# Patient Record
Sex: MALE | NOT HISPANIC OR LATINO | URBAN - METROPOLITAN AREA
[De-identification: names, ages, dates, MRNs, and addresses within clinical notes are randomized per-mention and may not be internally consistent; named-entity substitution may affect disease eponyms.]

---

## 2017-06-27 ENCOUNTER — CHARTING TRANS (OUTPATIENT)
Dept: URGENT CARE | Age: 82
End: 2017-06-27

## 2017-06-27 ASSESSMENT — PAIN SCALES - GENERAL: PAINLEVEL_OUTOF10: 0

## 2017-06-29 ENCOUNTER — IMAGING SERVICES (OUTPATIENT)
Dept: OTHER | Age: 82
End: 2017-06-29

## 2017-06-29 ENCOUNTER — CHARTING TRANS (OUTPATIENT)
Dept: URGENT CARE | Age: 82
End: 2017-06-29

## 2017-06-29 ASSESSMENT — PAIN SCALES - GENERAL: PAINLEVEL_OUTOF10: 8

## 2018-03-01 ENCOUNTER — CHARTING TRANS (OUTPATIENT)
Dept: OTHER | Age: 83
End: 2018-03-01

## 2018-03-06 ENCOUNTER — CHARTING TRANS (OUTPATIENT)
Dept: OTHER | Age: 83
End: 2018-03-06

## 2018-03-11 ENCOUNTER — CHARTING TRANS (OUTPATIENT)
Dept: OTHER | Age: 83
End: 2018-03-11

## 2018-11-28 VITALS
RESPIRATION RATE: 18 BRPM | OXYGEN SATURATION: 100 % | SYSTOLIC BLOOD PRESSURE: 138 MMHG | HEART RATE: 56 BPM | TEMPERATURE: 97.4 F | DIASTOLIC BLOOD PRESSURE: 74 MMHG

## 2018-11-28 VITALS
DIASTOLIC BLOOD PRESSURE: 60 MMHG | TEMPERATURE: 98 F | HEART RATE: 66 BPM | RESPIRATION RATE: 16 BRPM | OXYGEN SATURATION: 99 % | SYSTOLIC BLOOD PRESSURE: 120 MMHG

## 2019-03-06 VITALS
HEART RATE: 66 BPM | SYSTOLIC BLOOD PRESSURE: 138 MMHG | DIASTOLIC BLOOD PRESSURE: 74 MMHG | OXYGEN SATURATION: 100 % | RESPIRATION RATE: 24 BRPM | TEMPERATURE: 98.6 F

## 2019-03-06 VITALS
RESPIRATION RATE: 18 BRPM | TEMPERATURE: 98 F | HEART RATE: 60 BPM | SYSTOLIC BLOOD PRESSURE: 124 MMHG | DIASTOLIC BLOOD PRESSURE: 58 MMHG | OXYGEN SATURATION: 99 %

## 2019-03-06 VITALS
TEMPERATURE: 97.6 F | DIASTOLIC BLOOD PRESSURE: 64 MMHG | HEART RATE: 55 BPM | OXYGEN SATURATION: 98 % | RESPIRATION RATE: 22 BRPM | SYSTOLIC BLOOD PRESSURE: 118 MMHG

## 2019-09-06 ENCOUNTER — WALK IN (OUTPATIENT)
Dept: URGENT CARE | Age: 84
End: 2019-09-06

## 2019-09-06 DIAGNOSIS — J32.9 SINUSITIS, UNSPECIFIED CHRONICITY, UNSPECIFIED LOCATION: Primary | ICD-10-CM

## 2019-09-06 PROCEDURE — 99214 OFFICE O/P EST MOD 30 MIN: CPT | Performed by: FAMILY MEDICINE

## 2019-09-06 RX ORDER — LATANOPROST 50 UG/ML
1 SOLUTION/ DROPS OPHTHALMIC 2 TIMES DAILY
COMMUNITY

## 2019-09-06 RX ORDER — DEXTROMETHORPHAN HYDROBROMIDE AND PROMETHAZINE HYDROCHLORIDE 15; 6.25 MG/5ML; MG/5ML
5 SYRUP ORAL 4 TIMES DAILY PRN
Qty: 240 ML | Refills: 0 | Status: SHIPPED | OUTPATIENT
Start: 2019-09-06 | End: 2019-09-16

## 2019-09-06 RX ORDER — LOSARTAN POTASSIUM 50 MG/1
1 TABLET ORAL DAILY
COMMUNITY

## 2019-09-06 RX ORDER — AMOXICILLIN 875 MG/1
875 TABLET, COATED ORAL 2 TIMES DAILY
Qty: 20 TABLET | Refills: 0 | Status: SHIPPED | OUTPATIENT
Start: 2019-09-06 | End: 2022-10-28 | Stop reason: ALTCHOICE

## 2019-09-06 SDOH — HEALTH STABILITY: MENTAL HEALTH: HOW OFTEN DO YOU HAVE A DRINK CONTAINING ALCOHOL?: NEVER

## 2019-09-06 ASSESSMENT — PAIN SCALES - GENERAL: PAINLEVEL: 0

## 2021-01-14 ENCOUNTER — WALK IN (OUTPATIENT)
Dept: URGENT CARE | Age: 86
End: 2021-01-14

## 2021-01-14 VITALS
HEART RATE: 64 BPM | OXYGEN SATURATION: 100 % | DIASTOLIC BLOOD PRESSURE: 72 MMHG | TEMPERATURE: 98 F | SYSTOLIC BLOOD PRESSURE: 124 MMHG | RESPIRATION RATE: 18 BRPM

## 2021-01-14 DIAGNOSIS — R68.2 DRY MOUTH: Primary | ICD-10-CM

## 2021-01-14 PROCEDURE — 99213 OFFICE O/P EST LOW 20 MIN: CPT | Performed by: EMERGENCY MEDICINE

## 2021-02-16 ENCOUNTER — HOSPITAL ENCOUNTER (EMERGENCY)
Facility: HOSPITAL | Age: 86
Discharge: HOME | End: 2021-02-16
Attending: EMERGENCY MEDICINE
Payer: MEDICARE

## 2021-02-16 ENCOUNTER — APPOINTMENT (OUTPATIENT)
Dept: GENERAL RADIOLOGY | Facility: HOSPITAL | Age: 86
End: 2021-02-16
Attending: EMERGENCY MEDICINE
Payer: MEDICARE

## 2021-02-16 VITALS
HEART RATE: 64 BPM | DIASTOLIC BLOOD PRESSURE: 74 MMHG | RESPIRATION RATE: 14 BRPM | TEMPERATURE: 98 F | OXYGEN SATURATION: 99 % | SYSTOLIC BLOOD PRESSURE: 184 MMHG

## 2021-02-16 DIAGNOSIS — M54.9 MODERATE BACK PAIN: Primary | ICD-10-CM

## 2021-02-16 PROCEDURE — 72110 X-RAY EXAM L-2 SPINE 4/>VWS: CPT | Performed by: EMERGENCY MEDICINE

## 2021-02-16 PROCEDURE — 99283 EMERGENCY DEPT VISIT LOW MDM: CPT | Performed by: EMERGENCY MEDICINE

## 2021-02-16 RX ORDER — HYDROCODONE BITARTRATE AND ACETAMINOPHEN 5; 325 MG/1; MG/1
1-2 TABLET ORAL EVERY 6 HOURS PRN
Qty: 12 TABLET | Refills: 0 | Status: SHIPPED | OUTPATIENT
Start: 2021-02-16 | End: 2021-02-23

## 2021-02-16 NOTE — ED PROVIDER NOTES
Patient Seen in: [facility] Emergency Department      History   Patient presents with:  Back Pain    Stated Complaint: Right flank/back pain x few months     HPI/Subjective:   HPI    This is 12-year-old man, history of hypertension prostate CA, here rate and regular rhythm. No Edema  Pulmonary:  Pulmonary effort is normal.  Normal breath sounds. No wheezing, rhonchi or rales. Abdominal: Soft nontender nondistended, normal bowel sounds, no guarding no rebound tenderness  Back: No midline tenderness. advised follow-up with PMD for further evaluation PT referral.  Return precautions discussed patient agrees with plan.                          Disposition and Plan     Clinical Impression:  Moderate back pain  (primary encounter diagnosis)    Disposition:

## 2021-02-16 NOTE — ED INITIAL ASSESSMENT (HPI)
To ED for chronic back in the left lower back x7 moths. No injuries. No pain at rest. Slight pain with bending and lifting. No numbness or tingling. No trauma. Ambulates without difficulty. \"I do 80 push up's in the morning and at night.  It hurts when I d

## 2021-05-25 VITALS
HEART RATE: 56 BPM | OXYGEN SATURATION: 97 % | DIASTOLIC BLOOD PRESSURE: 68 MMHG | SYSTOLIC BLOOD PRESSURE: 138 MMHG | RESPIRATION RATE: 16 BRPM | TEMPERATURE: 96.8 F

## 2021-07-16 ENCOUNTER — HOSPITAL ENCOUNTER (EMERGENCY)
Facility: HOSPITAL | Age: 86
Discharge: HOME | End: 2021-07-16
Attending: EMERGENCY MEDICINE
Payer: MEDICARE

## 2021-07-16 ENCOUNTER — APPOINTMENT (OUTPATIENT)
Dept: CT IMAGING | Facility: HOSPITAL | Age: 86
End: 2021-07-16
Attending: EMERGENCY MEDICINE
Payer: MEDICARE

## 2021-07-16 VITALS
HEIGHT: 66 IN | OXYGEN SATURATION: 98 % | TEMPERATURE: 99 F | WEIGHT: 150 LBS | BODY MASS INDEX: 24.11 KG/M2 | RESPIRATION RATE: 20 BRPM | DIASTOLIC BLOOD PRESSURE: 64 MMHG | HEART RATE: 51 BPM | SYSTOLIC BLOOD PRESSURE: 163 MMHG

## 2021-07-16 DIAGNOSIS — R31.29 HEMATURIA, MICROSCOPIC: ICD-10-CM

## 2021-07-16 DIAGNOSIS — M54.9 BACK PAIN WITHOUT RADIATION: Primary | ICD-10-CM

## 2021-07-16 LAB
ALBUMIN SERPL-MCNC: 3.9 G/DL (ref 3.4–5)
ALBUMIN/GLOB SERPL: 1.2 {RATIO} (ref 1–2)
ALP LIVER SERPL-CCNC: 73 U/L
ALT SERPL-CCNC: 38 U/L
ANION GAP SERPL CALC-SCNC: 3 MMOL/L (ref 0–18)
AST SERPL-CCNC: 27 U/L (ref 15–37)
BASOPHILS # BLD AUTO: 0.03 X10(3) UL (ref 0–0.2)
BASOPHILS NFR BLD AUTO: 0.5 %
BILIRUB SERPL-MCNC: 0.3 MG/DL (ref 0.1–2)
BILIRUB UR QL STRIP.AUTO: NEGATIVE
BUN BLD-MCNC: 23 MG/DL (ref 7–18)
BUN/CREAT SERPL: 20.4 (ref 10–20)
CALCIUM BLD-MCNC: 9.3 MG/DL (ref 8.5–10.1)
CHLORIDE SERPL-SCNC: 112 MMOL/L (ref 98–112)
CLARITY UR REFRACT.AUTO: CLEAR
CO2 SERPL-SCNC: 29 MMOL/L (ref 21–32)
COLOR UR AUTO: COLORLESS
CREAT BLD-MCNC: 1.13 MG/DL
DEPRECATED RDW RBC AUTO: 39.8 FL (ref 35.1–46.3)
EOSINOPHIL # BLD AUTO: 0.14 X10(3) UL (ref 0–0.7)
EOSINOPHIL NFR BLD AUTO: 2.4 %
ERYTHROCYTE [DISTWIDTH] IN BLOOD BY AUTOMATED COUNT: 15.1 % (ref 11–15)
GLOBULIN PLAS-MCNC: 3.3 G/DL (ref 2.8–4.4)
GLUCOSE BLD-MCNC: 97 MG/DL (ref 70–99)
GLUCOSE UR STRIP.AUTO-MCNC: NEGATIVE MG/DL
HCT VFR BLD AUTO: 35 %
HGB BLD-MCNC: 10.9 G/DL
IMM GRANULOCYTES # BLD AUTO: 0.01 X10(3) UL (ref 0–1)
IMM GRANULOCYTES NFR BLD: 0.2 %
KETONES UR STRIP.AUTO-MCNC: NEGATIVE MG/DL
LEUKOCYTE ESTERASE UR QL STRIP.AUTO: NEGATIVE
LYMPHOCYTES # BLD AUTO: 1.63 X10(3) UL (ref 1–4)
LYMPHOCYTES NFR BLD AUTO: 28.1 %
M PROTEIN MFR SERPL ELPH: 7.2 G/DL (ref 6.4–8.2)
MCH RBC QN AUTO: 22.9 PG (ref 26–34)
MCHC RBC AUTO-ENTMCNC: 31.1 G/DL (ref 31–37)
MCV RBC AUTO: 73.4 FL
MONOCYTES # BLD AUTO: 0.79 X10(3) UL (ref 0.1–1)
MONOCYTES NFR BLD AUTO: 13.6 %
NEUTROPHILS # BLD AUTO: 3.21 X10 (3) UL (ref 1.5–7.7)
NEUTROPHILS # BLD AUTO: 3.21 X10(3) UL (ref 1.5–7.7)
NEUTROPHILS NFR BLD AUTO: 55.2 %
NITRITE UR QL STRIP.AUTO: NEGATIVE
OSMOLALITY SERPL CALC.SUM OF ELEC: 302 MOSM/KG (ref 275–295)
PH UR STRIP.AUTO: 5 [PH] (ref 5–8)
PLATELET # BLD AUTO: 212 10(3)UL (ref 150–450)
POTASSIUM SERPL-SCNC: 4.4 MMOL/L (ref 3.5–5.1)
PROT UR STRIP.AUTO-MCNC: NEGATIVE MG/DL
RBC # BLD AUTO: 4.77 X10(6)UL
SODIUM SERPL-SCNC: 144 MMOL/L (ref 136–145)
SP GR UR STRIP.AUTO: 1 (ref 1–1.03)
UROBILINOGEN UR STRIP.AUTO-MCNC: <2 MG/DL
WBC # BLD AUTO: 5.8 X10(3) UL (ref 4–11)

## 2021-07-16 PROCEDURE — 85025 COMPLETE CBC W/AUTO DIFF WBC: CPT | Performed by: EMERGENCY MEDICINE

## 2021-07-16 PROCEDURE — 36415 COLL VENOUS BLD VENIPUNCTURE: CPT

## 2021-07-16 PROCEDURE — 99284 EMERGENCY DEPT VISIT MOD MDM: CPT

## 2021-07-16 PROCEDURE — 74176 CT ABD & PELVIS W/O CONTRAST: CPT | Performed by: EMERGENCY MEDICINE

## 2021-07-16 PROCEDURE — 81001 URINALYSIS AUTO W/SCOPE: CPT | Performed by: EMERGENCY MEDICINE

## 2021-07-16 PROCEDURE — 80053 COMPREHEN METABOLIC PANEL: CPT | Performed by: EMERGENCY MEDICINE

## 2021-07-16 RX ORDER — DOCUSATE SODIUM 100 MG/1
100 CAPSULE, LIQUID FILLED ORAL 2 TIMES DAILY
COMMUNITY

## 2021-07-16 RX ORDER — FERROUS SULFATE TAB EC 324 MG (65 MG FE EQUIVALENT) 324 (65 FE) MG
1 TABLET DELAYED RESPONSE ORAL DAILY
COMMUNITY

## 2021-07-16 RX ORDER — OMEPRAZOLE 20 MG/1
20 CAPSULE, DELAYED RELEASE ORAL
Status: ON HOLD | COMMUNITY
End: 2021-08-19

## 2021-07-16 RX ORDER — TRAMADOL HYDROCHLORIDE 50 MG/1
TABLET ORAL EVERY 6 HOURS PRN
Qty: 10 TABLET | Refills: 0 | Status: SHIPPED | OUTPATIENT
Start: 2021-07-16 | End: 2021-07-23

## 2021-07-16 NOTE — ED INITIAL ASSESSMENT (HPI)
Patient reports left lower back pain for the past 5 months. Patient states that it hurts when he is \"stooped over\", states he feels the pain when he is pulling weeds. describes pain as a \"pinching\" sensation.
2 seconds or less

## 2021-08-12 ENCOUNTER — APPOINTMENT (OUTPATIENT)
Dept: GENERAL RADIOLOGY | Facility: HOSPITAL | Age: 86
DRG: 380 | End: 2021-08-12
Attending: EMERGENCY MEDICINE
Payer: OTHER GOVERNMENT

## 2021-08-12 ENCOUNTER — APPOINTMENT (OUTPATIENT)
Dept: CT IMAGING | Facility: HOSPITAL | Age: 86
DRG: 380 | End: 2021-08-12
Attending: EMERGENCY MEDICINE
Payer: OTHER GOVERNMENT

## 2021-08-12 ENCOUNTER — HOSPITAL ENCOUNTER (INPATIENT)
Facility: HOSPITAL | Age: 86
LOS: 7 days | Discharge: HOME HEALTH | DRG: 380 | End: 2021-08-19
Attending: EMERGENCY MEDICINE | Admitting: HOSPITALIST
Payer: OTHER GOVERNMENT

## 2021-08-12 DIAGNOSIS — K31.5 DUODENAL OBSTRUCTION: Primary | ICD-10-CM

## 2021-08-12 LAB
ALBUMIN SERPL-MCNC: 3.9 G/DL (ref 3.4–5)
ALBUMIN/GLOB SERPL: 1 {RATIO} (ref 1–2)
ALP LIVER SERPL-CCNC: 60 U/L
ALT SERPL-CCNC: 36 U/L
ANION GAP SERPL CALC-SCNC: 4 MMOL/L (ref 0–18)
AST SERPL-CCNC: 33 U/L (ref 15–37)
BASOPHILS # BLD AUTO: 0.03 X10(3) UL (ref 0–0.2)
BASOPHILS NFR BLD AUTO: 0.3 %
BILIRUB SERPL-MCNC: 0.5 MG/DL (ref 0.1–2)
BUN BLD-MCNC: 26 MG/DL (ref 7–18)
CALCIUM BLD-MCNC: 10 MG/DL (ref 8.5–10.1)
CHLORIDE SERPL-SCNC: 108 MMOL/L (ref 98–112)
CO2 SERPL-SCNC: 33 MMOL/L (ref 21–32)
CREAT BLD-MCNC: 1.43 MG/DL
EOSINOPHIL # BLD AUTO: 0.01 X10(3) UL (ref 0–0.7)
EOSINOPHIL NFR BLD AUTO: 0.1 %
ERYTHROCYTE [DISTWIDTH] IN BLOOD BY AUTOMATED COUNT: 15 %
GLOBULIN PLAS-MCNC: 4 G/DL (ref 2.8–4.4)
GLUCOSE BLD-MCNC: 141 MG/DL (ref 70–99)
HCT VFR BLD AUTO: 38.5 %
HGB BLD-MCNC: 12.3 G/DL
IMM GRANULOCYTES # BLD AUTO: 0.05 X10(3) UL (ref 0–1)
IMM GRANULOCYTES NFR BLD: 0.4 %
LIPASE SERPL-CCNC: 1506 U/L (ref 73–393)
LYMPHOCYTES # BLD AUTO: 0.78 X10(3) UL (ref 1–4)
LYMPHOCYTES NFR BLD AUTO: 6.7 %
M PROTEIN MFR SERPL ELPH: 7.9 G/DL (ref 6.4–8.2)
MCH RBC QN AUTO: 23.4 PG (ref 26–34)
MCHC RBC AUTO-ENTMCNC: 31.9 G/DL (ref 31–37)
MCV RBC AUTO: 73.3 FL
MONOCYTES # BLD AUTO: 0.66 X10(3) UL (ref 0.1–1)
MONOCYTES NFR BLD AUTO: 5.7 %
NEUTROPHILS # BLD AUTO: 10.07 X10 (3) UL (ref 1.5–7.7)
NEUTROPHILS # BLD AUTO: 10.07 X10(3) UL (ref 1.5–7.7)
NEUTROPHILS NFR BLD AUTO: 86.8 %
OSMOLALITY SERPL CALC.SUM OF ELEC: 307 MOSM/KG (ref 275–295)
PLATELET # BLD AUTO: 208 10(3)UL (ref 150–450)
POTASSIUM SERPL-SCNC: 3.8 MMOL/L (ref 3.5–5.1)
RBC # BLD AUTO: 5.25 X10(6)UL
SARS-COV-2 RNA RESP QL NAA+PROBE: NOT DETECTED
SODIUM SERPL-SCNC: 145 MMOL/L (ref 136–145)
WBC # BLD AUTO: 11.6 X10(3) UL (ref 4–11)

## 2021-08-12 PROCEDURE — 74176 CT ABD & PELVIS W/O CONTRAST: CPT | Performed by: EMERGENCY MEDICINE

## 2021-08-12 PROCEDURE — 71045 X-RAY EXAM CHEST 1 VIEW: CPT | Performed by: EMERGENCY MEDICINE

## 2021-08-12 PROCEDURE — 99222 1ST HOSP IP/OBS MODERATE 55: CPT | Performed by: HOSPITALIST

## 2021-08-12 RX ORDER — ONDANSETRON 2 MG/ML
4 INJECTION INTRAMUSCULAR; INTRAVENOUS EVERY 6 HOURS PRN
Status: DISCONTINUED | OUTPATIENT
Start: 2021-08-12 | End: 2021-08-19

## 2021-08-12 RX ORDER — SODIUM CHLORIDE 9 MG/ML
INJECTION, SOLUTION INTRAVENOUS CONTINUOUS
Status: DISCONTINUED | OUTPATIENT
Start: 2021-08-12 | End: 2021-08-19

## 2021-08-12 RX ORDER — AMLODIPINE BESYLATE 5 MG/1
5 TABLET ORAL DAILY
COMMUNITY
Start: 2021-08-10

## 2021-08-12 RX ORDER — LIDOCAINE HYDROCHLORIDE 20 MG/ML
10 JELLY TOPICAL ONCE
Status: COMPLETED | OUTPATIENT
Start: 2021-08-12 | End: 2021-08-12

## 2021-08-12 RX ORDER — HEPARIN SODIUM 5000 [USP'U]/ML
5000 INJECTION, SOLUTION INTRAVENOUS; SUBCUTANEOUS EVERY 8 HOURS SCHEDULED
Status: DISCONTINUED | OUTPATIENT
Start: 2021-08-12 | End: 2021-08-19

## 2021-08-12 RX ORDER — TIZANIDINE 4 MG/1
4 TABLET ORAL AS NEEDED
COMMUNITY
Start: 2021-07-27

## 2021-08-12 RX ORDER — SODIUM CHLORIDE 9 MG/ML
1000 INJECTION, SOLUTION INTRAVENOUS ONCE
Status: COMPLETED | OUTPATIENT
Start: 2021-08-12 | End: 2021-08-12

## 2021-08-12 NOTE — H&P
[facility]PITAL[facility]  History and Physical     [place] Risk Patient Status:  Inpatient    10/4/1932 MRN [id number]   [facility] 0SW-A Attending Batsheva Denny MD   Hosp Day # 0 PCP PHYSICIAN NONSTAFF     Chief Complaint: Abdominal pain Take 20 mg by mouth every morning before breakfast., Disp: , Rfl:   Ferrous Sulfate 324 (65 Fe) MG Oral Tab EC, Take 1 tablet by mouth daily. , Disp: , Rfl:         Review of Systems:   A comprehensive 14 point review of systems was completed.     Fay Moran input(s): CK in the last 168 hours. Inflammatory Markers  No results for input(s): CRP, ANGELICA, LDH, DDIMER in the last 168 hours. Imaging: Imaging data reviewed in Epic.       ASSESSMENT / PLAN:     3 80years old man presented with epigastric pain as

## 2021-08-12 NOTE — ED PROVIDER NOTES
Patient Seen in: [facility] Emergency Department      History   Patient presents with:  Abdomen/Flank Pain    Stated Complaint: abd pain    HPI/Subjective:   HPI    Patient is a friendly 80year-old gentleman here concerned about abdominal bloating. good color. Pulmonary/Chest: Normal effort. No accessory muscle use. No clubbing, no cyanosis. Abdominal: Soft. There is no tenderness. There is no guarding. Distended. Active bowel sounds. Musculoskeletal: No swelling, deformity or ecchymosis. not have any acute findings. CBC is fine. Blood work shows a small bump in BUN and creatinine. IV fluids. I have personally visualized the Radiology studies and agree with interpretation from radiology.     Markedly distended stomach, distended diverticulosis of the sigmoid and left colon without diverticulitis. Again noted is a large cyst which appears bilobed and measures 10.6 x 6.9 x 4.3 cm. This is  along the midline of the right kidney.   There is some hydronephrosis of some of the calices seeds again noted.    Dictated by (CST): Asher Ulrich MD on 8/12/2021 at 9:19 AM     Finalized by (CST): Asher Ulrich MD on 8/12/2021 at 9:31 AM              MDM            Patient presents with a sensation of abdominal bloating, found to have a

## 2021-08-12 NOTE — CONSULTS
[facility]                       Gastroenterology [address] [place] [address] Gastroenterology    [place] Patient Status:  Emergency    10/4/1932 MRN [id number]   Location [facility] Attending Wm Nash MD   [facility] Day # malignancies; No family history of ulcer disease, or inflammatory bowel disease  ROS:  In addition to the pertinent positives described above:             Infectious Disease:  No chronic infections or recent fevers prior to the acute illness            Car Component Value Date    WBC 11.6 08/12/2021    HGB 12.3 08/12/2021    HCT 38.5 08/12/2021    .0 08/12/2021    CREATSERUM 1.43 08/12/2021    BUN 26 08/12/2021     08/12/2021    K 3.8 08/12/2021     08/12/2021    CO2 33.0 08/12/2021    G RETROPERITONEUM:  No mass or adenopathy.     BOWEL/MESENTERY:  Moderate to severe distention of the stomach by fluid.  There is fluid within a minimally distended distal esophagus consistent with reflux. 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  There is fluid in the distal esophagus consistent with reflux.    3. Again noted is a large bilobed parapelvic/central renal cyst on the right this is slightly larger there is some mild hyd assessment and recommendations. Briefly, this is a 66-year-old male with a past medical history of hypertension, peptic ulcer disease presenting with abdominal pain and abnormal CT scan.   Patient has been having significant bloating and distention for

## 2021-08-12 NOTE — ED INITIAL ASSESSMENT (HPI)
A/o x3, pt states he ate pizza and watermelon and now \"I ate too much,\" stomach pains, hx of getting \"stomach pumped\"

## 2021-08-13 ENCOUNTER — APPOINTMENT (OUTPATIENT)
Dept: GENERAL RADIOLOGY | Facility: HOSPITAL | Age: 86
DRG: 380 | End: 2021-08-13
Attending: NURSE PRACTITIONER
Payer: OTHER GOVERNMENT

## 2021-08-13 LAB
ALBUMIN SERPL-MCNC: 3.1 G/DL (ref 3.4–5)
ALBUMIN/GLOB SERPL: 1.1 {RATIO} (ref 1–2)
ALP LIVER SERPL-CCNC: 47 U/L
ALT SERPL-CCNC: 23 U/L
ANION GAP SERPL CALC-SCNC: 3 MMOL/L (ref 0–18)
AST SERPL-CCNC: 14 U/L (ref 15–37)
BASOPHILS # BLD AUTO: 0.03 X10(3) UL (ref 0–0.2)
BASOPHILS NFR BLD AUTO: 0.4 %
BILIRUB SERPL-MCNC: 0.7 MG/DL (ref 0.1–2)
BUN BLD-MCNC: 15 MG/DL (ref 7–18)
CALCIUM BLD-MCNC: 8.4 MG/DL (ref 8.5–10.1)
CHLORIDE SERPL-SCNC: 117 MMOL/L (ref 98–112)
CO2 SERPL-SCNC: 27 MMOL/L (ref 21–32)
CREAT BLD-MCNC: 0.93 MG/DL
EOSINOPHIL # BLD AUTO: 0.07 X10(3) UL (ref 0–0.7)
EOSINOPHIL NFR BLD AUTO: 0.8 %
ERYTHROCYTE [DISTWIDTH] IN BLOOD BY AUTOMATED COUNT: 15 %
GLOBULIN PLAS-MCNC: 2.9 G/DL (ref 2.8–4.4)
GLUCOSE BLD-MCNC: 80 MG/DL (ref 70–99)
HCT VFR BLD AUTO: 32.8 %
HGB BLD-MCNC: 10.1 G/DL
IMM GRANULOCYTES # BLD AUTO: 0.02 X10(3) UL (ref 0–1)
IMM GRANULOCYTES NFR BLD: 0.2 %
LYMPHOCYTES # BLD AUTO: 1.28 X10(3) UL (ref 1–4)
LYMPHOCYTES NFR BLD AUTO: 15.5 %
M PROTEIN MFR SERPL ELPH: 6 G/DL (ref 6.4–8.2)
MCH RBC QN AUTO: 23.3 PG (ref 26–34)
MCHC RBC AUTO-ENTMCNC: 30.8 G/DL (ref 31–37)
MCV RBC AUTO: 75.8 FL
MONOCYTES # BLD AUTO: 0.81 X10(3) UL (ref 0.1–1)
MONOCYTES NFR BLD AUTO: 9.8 %
NEUTROPHILS # BLD AUTO: 6.05 X10 (3) UL (ref 1.5–7.7)
NEUTROPHILS # BLD AUTO: 6.05 X10(3) UL (ref 1.5–7.7)
NEUTROPHILS NFR BLD AUTO: 73.3 %
OSMOLALITY SERPL CALC.SUM OF ELEC: 304 MOSM/KG (ref 275–295)
PLATELET # BLD AUTO: 156 10(3)UL (ref 150–450)
POTASSIUM SERPL-SCNC: 3.6 MMOL/L (ref 3.5–5.1)
RBC # BLD AUTO: 4.33 X10(6)UL
SODIUM SERPL-SCNC: 147 MMOL/L (ref 136–145)
WBC # BLD AUTO: 8.3 X10(3) UL (ref 4–11)

## 2021-08-13 PROCEDURE — 74240 X-RAY XM UPR GI TRC 1CNTRST: CPT | Performed by: NURSE PRACTITIONER

## 2021-08-13 PROCEDURE — 74248 X-RAY SM INT F-THRU STD: CPT | Performed by: NURSE PRACTITIONER

## 2021-08-13 RX ORDER — HYDRALAZINE HYDROCHLORIDE 20 MG/ML
10 INJECTION INTRAMUSCULAR; INTRAVENOUS EVERY 6 HOURS PRN
Status: DISCONTINUED | OUTPATIENT
Start: 2021-08-13 | End: 2021-08-19

## 2021-08-13 RX ORDER — POTASSIUM CHLORIDE 14.9 MG/ML
20 INJECTION INTRAVENOUS ONCE
Status: COMPLETED | OUTPATIENT
Start: 2021-08-13 | End: 2021-08-13

## 2021-08-13 RX ORDER — MORPHINE SULFATE 2 MG/ML
1 INJECTION, SOLUTION INTRAMUSCULAR; INTRAVENOUS EVERY 4 HOURS PRN
Status: DISCONTINUED | OUTPATIENT
Start: 2021-08-13 | End: 2021-08-19

## 2021-08-13 RX ORDER — ACETAMINOPHEN 10 MG/ML
1000 INJECTION, SOLUTION INTRAVENOUS EVERY 6 HOURS PRN
Status: DISCONTINUED | OUTPATIENT
Start: 2021-08-13 | End: 2021-08-19

## 2021-08-13 RX ORDER — LORAZEPAM 2 MG/ML
0.5 INJECTION INTRAMUSCULAR EVERY 6 HOURS PRN
Status: DISCONTINUED | OUTPATIENT
Start: 2021-08-13 | End: 2021-08-19

## 2021-08-13 NOTE — PROGRESS NOTES
Denies abd pain or nausea. NGT to LIS w/brown drainage. Hypoactive bowel sounds. Occas agitation/confusion. Able to reorient and calmer after plan of care reviewed w/pt. To xray for UGI series.

## 2021-08-13 NOTE — PAYOR COMM NOTE
--------------  ADMISSION REVIEW     Payor: [address] [place] [address] PPO  Subscriber #:  [id number]  Authorization Number: [id number]    Admit date: 8/12/21  Admit time:  2:34 PM       REVIEW DOCUMENTATION:     ED Provider Notes      ED Provider Notes signed by Jeremy Rivera Current:/64   Pulse 58   Temp 97.6 °F (36.4 °C) (Temporal)   Resp 16   Wt 65.8 kg   SpO2 97%   BMI 23.40 kg/m²         Physical Exam      Constitutional: Awake, alert, age appearing, non-toxic  Head: Normocephalic and atraumatic.      Eyes: EOM Blood work shows a small bump in BUN and creatinine. IV fluids. I have personally visualized the Radiology studies and agree with interpretation from radiology.     Markedly distended stomach, distended first and second part of duodenum decompress Disposition:  Admit  8/12/2021  9:39 am       H&P - H&P Note      H&P signed by Brittanie Kramer MD at 8/12/2021  6:20 PM     Author: Brittanie Kramer MD Service: Hospitalist Author Type: Physician    Filed: 8/12/2021  6:20 PM Date of Service: 8/12/2021  2:50 Psychiatric: Appropriate mood and affect.       Diagnostic Data:      Labs:  Recent Labs   Lab 08/12/21  0751   WBC 11.6*   HGB 12.3*   MCV 73.3*   .0       Recent Labs   Lab 08/12/21  0751   *   BUN 26*   CREATSERUM 1.43*   GFRAA 50*   GFRN (PF) 0.9 % 10 mL IV push     Date Action Dose Route User    8/12/2021 1646 Given 40 mg Intravenous Neisha MCKEON RN      phenol (CHLORASEPTIC) 1.4 % oral liquid spray     Date Action Dose Route User    8/13/2021 0208 Given 5 spray Oral Neelam Lulu

## 2021-08-13 NOTE — PROGRESS NOTES
[facility] Gastroenterology Progress Note    CC: abd distention    S: Pt at XRAY; attempted to see x 2     O: /70 (BP Location: Left arm)   Pulse 80   Temp 98.2 °F (36.8 °C) (Oral)   Resp 18   Wt 145 lb (65.8 kg)   SpO2 96%   BMI 23.40 kg/m²

## 2021-08-13 NOTE — PLAN OF CARE
Assumed care at 1710 Anastacio Sanders pt on bed, on room air  NGT to KAVON  Denies abdominal pain and nausea  IVF  Obstructive series in am  Problem: Patient/Family Goals  Goal: Patient/Family Long Term Goal  Description: Patient's Long Term Goal: resolve SBO    [place] response  - Consider cultural and social influences on pain and pain management  - Manage/alleviate anxiety  - Utilize distraction and/or relaxation techniques  - Monitor for opioid side effects  - Notify MD/LIP if interventions unsuccessful or patient rep

## 2021-08-13 NOTE — PROGRESS NOTES
Pt seen and examined. abd pain resolved. NPO. A NG in place        08/13/21  0530   BP:    Pulse: 80   Resp:    Temp:          O/E    HEENT- No pallor, No icterus  Neck- supple, No LAD  Lungs- Jagdeep CTA  Heart-S1S2+, RRR  Abd- BS+, No tenderness, No HSM.    Ext

## 2021-08-14 ENCOUNTER — APPOINTMENT (OUTPATIENT)
Dept: GENERAL RADIOLOGY | Facility: HOSPITAL | Age: 86
DRG: 380 | End: 2021-08-14
Attending: INTERNAL MEDICINE
Payer: OTHER GOVERNMENT

## 2021-08-14 LAB — POTASSIUM SERPL-SCNC: 3.6 MMOL/L (ref 3.5–5.1)

## 2021-08-14 PROCEDURE — 71045 X-RAY EXAM CHEST 1 VIEW: CPT | Performed by: INTERNAL MEDICINE

## 2021-08-14 NOTE — PROGRESS NOTES
Assumed care in am, respirations easy unlabored regular, skin W/D/I, NG to LIS , w/ brown-red drainage noted to canister, abdomen soft , denies nausea , denies flatus, Usually up ad jose martin , encouraged to get up ,, increased activity w/ rationale , Strict NPO

## 2021-08-14 NOTE — PROGRESS NOTES
Patient seen and examined, on NG tube, lying in the bed.     O:   Physical Exam:   /66 (BP Location: Right arm)   Pulse 54   Temp 98.7 °F (37.1 °C) (Temporal)   Resp 19   Wt 145 lb (65.8 kg)   SpO2 97%   BMI 23.40 kg/m²   Temp (24hrs), Av.7 °F (37

## 2021-08-14 NOTE — PLAN OF CARE
Assumed care of patient at 1900. Monitor continuous pulse ox on room air. NG to LIS. Nothing by mouth. IV fluids infusing. Fall precautions in place. Patient transfer with belongings to room 409.

## 2021-08-14 NOTE — PROGRESS NOTES
[facility] Gastroenterology Progress Note    CC: abd distention    S: Pt sitting up in chair; NGT without bilious output.  No overt GI bleeding;     O: /66 (BP Location: Right arm)   Pulse 54   Temp 98.7 °F (37.1 °C) (Temporal)   Resp 19   Wt 145

## 2021-08-14 NOTE — PLAN OF CARE
Patient is alert and oriented, forgetful at times. [place]. Left hearing aid. RA. SBP in 170's; PRN hydralazine given-see MAR. Repeat blood pressure 159/65. NPO. NG to LIS. C/o pain with NG; PRN phenol oral liquid spray given with no relief.  MD paged regarding

## 2021-08-15 LAB
ALBUMIN SERPL-MCNC: 2.8 G/DL (ref 3.4–5)
ALBUMIN/GLOB SERPL: 0.8 {RATIO} (ref 1–2)
ALP LIVER SERPL-CCNC: 48 U/L
ALT SERPL-CCNC: 21 U/L
ANION GAP SERPL CALC-SCNC: 0 MMOL/L (ref 0–18)
AST SERPL-CCNC: 23 U/L (ref 15–37)
BASOPHILS # BLD AUTO: 0.02 X10(3) UL (ref 0–0.2)
BASOPHILS NFR BLD AUTO: 0.2 %
BILIRUB SERPL-MCNC: 0.7 MG/DL (ref 0.1–2)
BUN BLD-MCNC: 13 MG/DL (ref 7–18)
CALCIUM BLD-MCNC: 8 MG/DL (ref 8.5–10.1)
CHLORIDE SERPL-SCNC: 119 MMOL/L (ref 98–112)
CO2 SERPL-SCNC: 29 MMOL/L (ref 21–32)
CREAT BLD-MCNC: 0.88 MG/DL
EOSINOPHIL # BLD AUTO: 0.05 X10(3) UL (ref 0–0.7)
EOSINOPHIL NFR BLD AUTO: 0.5 %
ERYTHROCYTE [DISTWIDTH] IN BLOOD BY AUTOMATED COUNT: 15 %
GLOBULIN PLAS-MCNC: 3.4 G/DL (ref 2.8–4.4)
GLUCOSE BLD-MCNC: 72 MG/DL (ref 70–99)
HCT VFR BLD AUTO: 34.3 %
HGB BLD-MCNC: 10.2 G/DL
IMM GRANULOCYTES # BLD AUTO: 0.04 X10(3) UL (ref 0–1)
IMM GRANULOCYTES NFR BLD: 0.4 %
LYMPHOCYTES # BLD AUTO: 1.16 X10(3) UL (ref 1–4)
LYMPHOCYTES NFR BLD AUTO: 11.2 %
M PROTEIN MFR SERPL ELPH: 6.2 G/DL (ref 6.4–8.2)
MCH RBC QN AUTO: 22.8 PG (ref 26–34)
MCHC RBC AUTO-ENTMCNC: 29.7 G/DL (ref 31–37)
MCV RBC AUTO: 76.7 FL
MONOCYTES # BLD AUTO: 1.05 X10(3) UL (ref 0.1–1)
MONOCYTES NFR BLD AUTO: 10.1 %
NEUTROPHILS # BLD AUTO: 8.03 X10 (3) UL (ref 1.5–7.7)
NEUTROPHILS # BLD AUTO: 8.03 X10(3) UL (ref 1.5–7.7)
NEUTROPHILS NFR BLD AUTO: 77.6 %
OSMOLALITY SERPL CALC.SUM OF ELEC: 305 MOSM/KG (ref 275–295)
PLATELET # BLD AUTO: 153 10(3)UL (ref 150–450)
POTASSIUM SERPL-SCNC: 3.6 MMOL/L (ref 3.5–5.1)
RBC # BLD AUTO: 4.47 X10(6)UL
SODIUM SERPL-SCNC: 148 MMOL/L (ref 136–145)
WBC # BLD AUTO: 10.4 X10(3) UL (ref 4–11)

## 2021-08-15 PROCEDURE — 99223 1ST HOSP IP/OBS HIGH 75: CPT | Performed by: SURGERY

## 2021-08-15 NOTE — PROGRESS NOTES
Patient seen and examined, no concerns.     O:   Physical Exam:   /57 (BP Location: Left arm)   Pulse 52   Temp 98.8 °F (37.1 °C) (Oral)   Resp 18   Wt 145 lb (65.8 kg)   SpO2 97%   BMI 23.40 kg/m²   Temp (24hrs), Av.9 °F (37.2 °C), Min:98.8 °F (3

## 2021-08-15 NOTE — PROGRESS NOTES
[facility] Gastroenterology Progress Note    CC: abd distention    S: Pt sitting up in chair; NGT in place  No overt GI bleeding.  abd distension improved    O: /73 (BP Location: Left arm)   Pulse 67   Temp 97.9 °F (36.6 °C) (Temporal)   Resp 19

## 2021-08-15 NOTE — CONSULTS
[facility]  Report of  Surgical Consultation with History and Physical Exam    Alyson Santacruz Patient Status:  Inpatient    10/4/1932 MRN [id number]   [facility] 4NW-A Attending Lotus Skiff, MD   Hosp Day # 3 PCP Jesús Vaz ROTATOR CUFF,ACUTE       History reviewed. No pertinent family history. reports that he has never smoked. He has never used smokeless tobacco. He reports that he does not drink alcohol and does not use drugs.     Allergies:  No Known Allergies    Medicati and psychiatric symptoms  Respiratory:  Negative for cough, dyspnea and wheezing      Physical Exam:  Blood pressure 148/73, pulse 67, temperature 97.9 °F (36.6 °C), temperature source Temporal, resp. rate 19, weight 145 lb (65.8 kg), SpO2 98 %.     General interval not displayed. No results for input(s): PTP, INR, PTT in the last 168 hours.       Impression:  Patient Active Problem List:     Personal history of prostate cancer     Duodenal obstruction      31-year-old male presents with 3-day history and I concur with the findings. Patient is comfortable with NG tube in place. On exam:    In general the patient is awake and comfortable in no acute distress. Head and neck: Without scleral icterus; no JVD. Chest: Symmetrically clear.   Heart: Normal

## 2021-08-15 NOTE — PLAN OF CARE
Pt alert and oriented x4. Hard of hearing. VSS, afebrile. C/o throat pain, decllines pain medication. Denies nausea or SOB. NGT remains in place and on LIS. Output brownish-red. Remains strict NPO, pt verbalizes understanding. Fall precautions in place.  Ca pre-medicate as appropriate  Outcome: Progressing

## 2021-08-15 NOTE — PLAN OF CARE
Awake & alert,sitting up in the chair. NG to LIS, with moderate bile drainage obtained. Maintained NPO. Oral care done & oral spray given for c/o dry & sore mouth & throat. Patient wants to drink but still NPO.  He gets frustrated that he can't drink anythi

## 2021-08-16 LAB
ALBUMIN SERPL-MCNC: 3 G/DL (ref 3.4–5)
ALBUMIN/GLOB SERPL: 0.8 {RATIO} (ref 1–2)
ALP LIVER SERPL-CCNC: 51 U/L
ALT SERPL-CCNC: 23 U/L
ANION GAP SERPL CALC-SCNC: 6 MMOL/L (ref 0–18)
AST SERPL-CCNC: 22 U/L (ref 15–37)
BASOPHILS # BLD AUTO: 0.03 X10(3) UL (ref 0–0.2)
BASOPHILS NFR BLD AUTO: 0.3 %
BILIRUB SERPL-MCNC: 0.9 MG/DL (ref 0.1–2)
BUN BLD-MCNC: 13 MG/DL (ref 7–18)
CALCIUM BLD-MCNC: 8.8 MG/DL (ref 8.5–10.1)
CHLORIDE SERPL-SCNC: 120 MMOL/L (ref 98–112)
CO2 SERPL-SCNC: 21 MMOL/L (ref 21–32)
CREAT BLD-MCNC: 0.88 MG/DL
EOSINOPHIL # BLD AUTO: 0.02 X10(3) UL (ref 0–0.7)
EOSINOPHIL NFR BLD AUTO: 0.2 %
ERYTHROCYTE [DISTWIDTH] IN BLOOD BY AUTOMATED COUNT: 15.3 %
GLOBULIN PLAS-MCNC: 3.6 G/DL (ref 2.8–4.4)
GLUCOSE BLD-MCNC: 76 MG/DL (ref 70–99)
HCT VFR BLD AUTO: 35.8 %
HGB BLD-MCNC: 11.2 G/DL
IMM GRANULOCYTES # BLD AUTO: 0.03 X10(3) UL (ref 0–1)
IMM GRANULOCYTES NFR BLD: 0.3 %
LYMPHOCYTES # BLD AUTO: 1.06 X10(3) UL (ref 1–4)
LYMPHOCYTES NFR BLD AUTO: 9.5 %
M PROTEIN MFR SERPL ELPH: 6.6 G/DL (ref 6.4–8.2)
MCH RBC QN AUTO: 23.7 PG (ref 26–34)
MCHC RBC AUTO-ENTMCNC: 31.3 G/DL (ref 31–37)
MCV RBC AUTO: 75.8 FL
MONOCYTES # BLD AUTO: 1.03 X10(3) UL (ref 0.1–1)
MONOCYTES NFR BLD AUTO: 9.3 %
NEUTROPHILS # BLD AUTO: 8.94 X10 (3) UL (ref 1.5–7.7)
NEUTROPHILS # BLD AUTO: 8.94 X10(3) UL (ref 1.5–7.7)
NEUTROPHILS NFR BLD AUTO: 80.4 %
OSMOLALITY SERPL CALC.SUM OF ELEC: 303 MOSM/KG (ref 275–295)
PLATELET # BLD AUTO: 170 10(3)UL (ref 150–450)
POTASSIUM SERPL-SCNC: 3.6 MMOL/L (ref 3.5–5.1)
RBC # BLD AUTO: 4.72 X10(6)UL
SODIUM SERPL-SCNC: 147 MMOL/L (ref 136–145)
WBC # BLD AUTO: 11.1 X10(3) UL (ref 4–11)

## 2021-08-16 PROCEDURE — 99232 SBSQ HOSP IP/OBS MODERATE 35: CPT | Performed by: PHYSICIAN ASSISTANT

## 2021-08-16 NOTE — DIETARY NOTE
[facility]     Admitting diagnosis:  Duodenal obstruction [K31.5]    Ht:  167.6 cm (5' 6\")  Wt: 65.8 kg (145 lb). Body mass index is 23.4 kg/m².   IBW: 65 kg      Wt Readings from Last 6 Encounters:  08/13/21 :

## 2021-08-16 NOTE — PROGRESS NOTES
[facility]  Progress Note    [place] Patient Status:  Inpatient    10/4/1932 MRN [id number]   [facility] 4NW-A Attending Jael Villarreal MD   Hosp Day # 4 PCP Robson Peck MD     Subjective:   The patient is sitti 15 minutes, more than 50% of the time was spent in counseling and/or coordination of care related to above listed issue.      Jennifer Packer PA-C  1/00/8861  12:40 PM

## 2021-08-16 NOTE — PROGRESS NOTES
S: patient seen and examined, no concerns.     O:  Physical Exam:   /83 (BP Location: Left arm)   Pulse 89   Temp 97.8 °F (36.6 °C) (Oral)   Resp 20   Wt 145 lb (65.8 kg)   SpO2 98%   BMI 23.40 kg/m²   Temp (24hrs), Av °F (36.7 °C), Min:97.8 °F (3

## 2021-08-16 NOTE — PROGRESS NOTES
K+=3.6, replaced IV per protocol, Up in chair all day , NG clamped to ambulate hallway , abdomen soft , BWS hypoactive, States passed gas

## 2021-08-16 NOTE — PLAN OF CARE
Pt alert and oriented x4. Hypertensive, prn hydralazine given per MAR. Normotensive after hydralazine, see flow sheets. Afebrile. C/o throat pain, prn pain medication per [facility] with relief. Denies nausea or SOB. IVF per [facility].  NGT continues to drain at low int reports new pain  - Anticipate increased pain with activity and pre-medicate as appropriate  Outcome: Progressing

## 2021-08-16 NOTE — PROGRESS NOTES
[facility] Gastroenterology Progress Note    CC: abd distention    S: Pt feeling much better today. He denies any nausea, vomiting or abdominal pain. He is walking the hallways without issues and he has hungry and has an appetite. His NG is clamped.  H colon in 90mins  -PPI BID  -Avoid non aspirin nsaids; I communicated with surgery team and spoke to Pt's daughter, Mc Powell - updated her on plan of care.        Henry Jha MD  [facility] Gastroenterology          The planned procedure(s), the explanation

## 2021-08-16 NOTE — PAYOR COMM NOTE
ASKING FOR RECONSIDERATION OF INPT  PT IS NPO, HAS A NGT IN PLACE. IT WAS IN THE ADMIT NOTES  IVF'S.      CONTINUED STAY REVIEW    Payor: Tess Hernández #:  [id number]  Authorization [id number]    Admit date: 8/12/21  Admit time:  2:34 PM    Admitting The patient states prior to his arrival, he ate a slice of pizza and watermelon. After sleeping for a couple of hours, he developed bloating and pain. At that time, he presented to the emergency room. He denies any nausea or vomiting.   He states he had --  27.0  --  29.0   ALKPHO 60  --  47  --  48   AST 33  --  14*  --  23   ALT 36  --  23  --  21   BILT 0.5  --  0.7  --  0.7   TP 7.9  --  6.0*  --  6.2*        80year-old male presents with 3-day history of abdominal pain and bloating.   Imaging suggest scleral icterus; no JVD. Chest: Symmetrically clear. Heart: Normal S1 and S2. Abdomen: Soft, nontender, nondistended, without rebound, guarding, rigidity. No peritoneal signs.   Extremities: Warm and well-perfused.     Assessment and plan:     Recurrent remove once residual resolve;   - PPI BID  - avoid non aspirin nsaids;   - if doing well, can consider NGT removal tomorrow and CLD  - Gen Surg consulted, I discussed case with team  - if tolerating liquids, will consider pursuing EGD for further evaluatio

## 2021-08-16 NOTE — CM/SW NOTE
08/16/21 0900   CM/SW Referral Data   Referral Source Social Work (self-referral)   Reason for Referral Discharge planning   Informant Patient   Patient Info   Patient's Current Mental Status at Time of Assessment Alert;Oriented   Patient Communication

## 2021-08-17 ENCOUNTER — ANESTHESIA EVENT (OUTPATIENT)
Dept: ENDOSCOPY | Facility: HOSPITAL | Age: 86
DRG: 380 | End: 2021-08-17
Payer: OTHER GOVERNMENT

## 2021-08-17 ENCOUNTER — ANESTHESIA (OUTPATIENT)
Dept: ENDOSCOPY | Facility: HOSPITAL | Age: 86
DRG: 380 | End: 2021-08-17
Payer: OTHER GOVERNMENT

## 2021-08-17 LAB
POTASSIUM SERPL-SCNC: 3.3 MMOL/L (ref 3.5–5.1)
SARS-COV-2 RNA RESP QL NAA+PROBE: NOT DETECTED

## 2021-08-17 PROCEDURE — 0W3P8ZZ CONTROL BLEEDING IN GASTROINTESTINAL TRACT, VIA NATURAL OR ARTIFICIAL OPENING ENDOSCOPIC: ICD-10-PCS | Performed by: STUDENT IN AN ORGANIZED HEALTH CARE EDUCATION/TRAINING PROGRAM

## 2021-08-17 RX ORDER — NALOXONE HYDROCHLORIDE 0.4 MG/ML
80 INJECTION, SOLUTION INTRAMUSCULAR; INTRAVENOUS; SUBCUTANEOUS AS NEEDED
Status: DISCONTINUED | OUTPATIENT
Start: 2021-08-17 | End: 2021-08-17 | Stop reason: HOSPADM

## 2021-08-17 RX ORDER — SODIUM CHLORIDE, SODIUM LACTATE, POTASSIUM CHLORIDE, CALCIUM CHLORIDE 600; 310; 30; 20 MG/100ML; MG/100ML; MG/100ML; MG/100ML
INJECTION, SOLUTION INTRAVENOUS CONTINUOUS
Status: DISCONTINUED | OUTPATIENT
Start: 2021-08-17 | End: 2021-08-17

## 2021-08-17 RX ORDER — LIDOCAINE HYDROCHLORIDE 10 MG/ML
INJECTION, SOLUTION EPIDURAL; INFILTRATION; INTRACAUDAL; PERINEURAL AS NEEDED
Status: DISCONTINUED | OUTPATIENT
Start: 2021-08-17 | End: 2021-08-17 | Stop reason: SURG

## 2021-08-17 RX ORDER — MAGNESIUM SULFATE HEPTAHYDRATE 40 MG/ML
2 INJECTION, SOLUTION INTRAVENOUS ONCE
Status: COMPLETED | OUTPATIENT
Start: 2021-08-17 | End: 2021-08-17

## 2021-08-17 RX ADMIN — SODIUM CHLORIDE: 9 INJECTION, SOLUTION INTRAVENOUS at 11:48:00

## 2021-08-17 RX ADMIN — LIDOCAINE HYDROCHLORIDE 50 MG: 10 INJECTION, SOLUTION EPIDURAL; INFILTRATION; INTRACAUDAL; PERINEURAL at 11:18:00

## 2021-08-17 RX ADMIN — SODIUM CHLORIDE: 9 INJECTION, SOLUTION INTRAVENOUS at 11:08:00

## 2021-08-17 NOTE — PROGRESS NOTES
Assumed care in AM , [place], awake appropriate, skin warm , respirations easy , K+=3.3, mag 1.3, replaced per protocol, IV Fluids , Electrolyte replacement , completed EGD , NG removed w/  EGD, tolerates  Clear liquid diet, up in chair , BRP ad jose martin diet advan

## 2021-08-17 NOTE — PROGRESS NOTES
[facility]  Progress Note    Mitchell Lauren Patient Status:  Inpatient    10/4/1932 MRN [id number]   [facility] 4NW-A Attending Lucillie Ahumada, MD   Hosp Day # 5 PCP Jeffrey Belle MD     Subjective:   The patient is sitti

## 2021-08-17 NOTE — OPERATIVE REPORT
EGD Operative Report  Patient Name: Marisa Singh  YOB: 1932  MRN: [id number]  Procedure: Esophagogastroduodenoscopy (EGD) with gastric ulcer Endo-therapy (epinephrine, hemostatic clips_  Pre-operative Diagnosis & Indication:   1.  Nausea, vo decubitus position and deep sedation was administered.  Once adequate sedation was achieved, a bite block was placed and a lubricated tip of an Olympus video upper endoscope was inserted through the oropharynx and gently manipulated through the esophagus in or stenosis within the visualized small intestine. Endoscope traversed through the entire visualized small intestine without any resistance.    Recommendations:  - Post EGD precautions, watch for bleeding, infection, perforation, adverse drug reactions ; C

## 2021-08-17 NOTE — ANESTHESIA POSTPROCEDURE EVALUATION
[address] Patient Status:  Inpatient   Age/Gender 80year old male MRN [id number]   Location [id number] [place] [address] Attending Bennett Ulloa MD   Hosp Day # 5 PCP Vijaya Dominguez MD       Anesthesia Post-

## 2021-08-17 NOTE — PROGRESS NOTES
Pt is A&Ox4, very hard of hearing. Hearing aid in. NG clamped. Trailed  clear liquids for dinner. Pt tolerated well. No N/V/D. High BP, hydralazine given per MAR. No complaints of pain. Medications per MAR. Up with standby. Bed alarm on.  Call light within

## 2021-08-17 NOTE — ANESTHESIA PREPROCEDURE EVALUATION
PRE-OP EVALUATION    Patient Name: Tiara Bryson    Admit Diagnosis: Duodenal obstruction [K31.5]    Pre-op Diagnosis: INPT    ESOPHAGOGASTRODUODENOSCOPY (EGD)    Anesthesia Procedure: ESOPHAGOGASTRODUODENOSCOPY (EGD) (N/A )    Surgeon(s) and Role:     * tiZANidine 4 MG Oral Tab, Take 4 mg by mouth 3 (three) times daily. , Disp: , Rfl: , Past Week at 2100  amLODIPine besylate 5 MG Oral Tab, Take 5 mg by mouth daily. , Disp: , Rfl: , Past Week at 0900  docusate sodium 100 MG Oral Cap, Take 100 mg by mouth 2 Airway      Mallampati: II  Mouth opening: >3 FB  TM distance: > 6 cm  Neck ROM: full Cardiovascular      Rhythm: regular  Rate: normal     Dental             Pulmonary      Breath sounds clear to auscultation bilaterally.                Other finding

## 2021-08-17 NOTE — PROGRESS NOTES
S: patient seen and examined, no concerns.     O:  Physical Exam:   /69 (BP Location: Left arm)   Pulse 90   Temp 98.7 °F (37.1 °C) (Oral)   Resp 18   Wt 145 lb (65.8 kg)   SpO2 93%   BMI 23.40 kg/m²   Temp (24hrs), Av.3 °F (36.8 °C), Min:97.9 °F

## 2021-08-18 LAB
ALBUMIN SERPL-MCNC: 2.3 G/DL (ref 3.4–5)
ALBUMIN/GLOB SERPL: 0.8 {RATIO} (ref 1–2)
ALP LIVER SERPL-CCNC: 38 U/L
ALT SERPL-CCNC: 20 U/L
ANION GAP SERPL CALC-SCNC: 2 MMOL/L (ref 0–18)
AST SERPL-CCNC: 20 U/L (ref 15–37)
BASOPHILS # BLD AUTO: 0.02 X10(3) UL (ref 0–0.2)
BASOPHILS NFR BLD AUTO: 0.3 %
BILIRUB SERPL-MCNC: 0.5 MG/DL (ref 0.1–2)
BUN BLD-MCNC: 9 MG/DL (ref 7–18)
CALCIUM BLD-MCNC: 7.8 MG/DL (ref 8.5–10.1)
CHLORIDE SERPL-SCNC: 120 MMOL/L (ref 98–112)
CO2 SERPL-SCNC: 26 MMOL/L (ref 21–32)
CREAT BLD-MCNC: 0.99 MG/DL
EOSINOPHIL # BLD AUTO: 0.06 X10(3) UL (ref 0–0.7)
EOSINOPHIL NFR BLD AUTO: 0.9 %
ERYTHROCYTE [DISTWIDTH] IN BLOOD BY AUTOMATED COUNT: 15 %
ERYTHROCYTE [DISTWIDTH] IN BLOOD BY AUTOMATED COUNT: 15.2 %
GLOBULIN PLAS-MCNC: 3 G/DL (ref 2.8–4.4)
GLUCOSE BLD-MCNC: 123 MG/DL (ref 70–99)
GLUCOSE BLD-MCNC: 99 MG/DL (ref 70–99)
HAV IGM SER QL: 2.2 MG/DL (ref 1.6–2.6)
HCT VFR BLD AUTO: 26 %
HCT VFR BLD AUTO: 27.3 %
HGB BLD-MCNC: 8.4 G/DL
HGB BLD-MCNC: 8.8 G/DL
IMM GRANULOCYTES # BLD AUTO: 0.02 X10(3) UL (ref 0–1)
IMM GRANULOCYTES NFR BLD: 0.3 %
LYMPHOCYTES # BLD AUTO: 1.2 X10(3) UL (ref 1–4)
LYMPHOCYTES NFR BLD AUTO: 18.2 %
M PROTEIN MFR SERPL ELPH: 5.3 G/DL (ref 6.4–8.2)
MCH RBC QN AUTO: 23.3 PG (ref 26–34)
MCH RBC QN AUTO: 23.3 PG (ref 26–34)
MCHC RBC AUTO-ENTMCNC: 32.2 G/DL (ref 31–37)
MCHC RBC AUTO-ENTMCNC: 32.3 G/DL (ref 31–37)
MCV RBC AUTO: 72.2 FL
MCV RBC AUTO: 72.4 FL
MONOCYTES # BLD AUTO: 0.68 X10(3) UL (ref 0.1–1)
MONOCYTES NFR BLD AUTO: 10.3 %
NEUTROPHILS # BLD AUTO: 4.62 X10 (3) UL (ref 1.5–7.7)
NEUTROPHILS # BLD AUTO: 4.62 X10(3) UL (ref 1.5–7.7)
NEUTROPHILS NFR BLD AUTO: 70 %
OSMOLALITY SERPL CALC.SUM OF ELEC: 305 MOSM/KG (ref 275–295)
PLATELET # BLD AUTO: 146 10(3)UL (ref 150–450)
PLATELET # BLD AUTO: 151 10(3)UL (ref 150–450)
POTASSIUM SERPL-SCNC: 3.5 MMOL/L (ref 3.5–5.1)
POTASSIUM SERPL-SCNC: 3.5 MMOL/L (ref 3.5–5.1)
RBC # BLD AUTO: 3.6 X10(6)UL
RBC # BLD AUTO: 3.77 X10(6)UL
SODIUM SERPL-SCNC: 148 MMOL/L (ref 136–145)
WBC # BLD AUTO: 6.5 X10(3) UL (ref 4–11)
WBC # BLD AUTO: 6.6 X10(3) UL (ref 4–11)

## 2021-08-18 PROCEDURE — 99232 SBSQ HOSP IP/OBS MODERATE 35: CPT | Performed by: SURGERY

## 2021-08-18 NOTE — PROGRESS NOTES
Pt is A&Ox4, very hard of hearing. VSS, no complaints of pain. Remains afebrile. Up to bedside, uses urinal. Bed alarm on. Call light within reach. Will continue to monitor.

## 2021-08-18 NOTE — CM/SW NOTE
RN stating the pt is weak. Requested a PT consult. Sent out [facility] referrals on Aidin. Checking to see if pt has HH benefits. Awaiting response from the [facility] agencies.

## 2021-08-18 NOTE — PROGRESS NOTES
[facility]  Progress Note    Duane Caldwell Patient Status:  Inpatient    10/4/1932 MRN [id number]   [facility] 4NW-A Attending Kaiden Chavez MD   Hosp Day # 6 PCP Joe Saleh MD     Subjective:   The patient is resti related to above listed issue. Jennifer Packer PA-C  5/11/2525  8:15 AM    Addendum:    I have seen and examined the patient; I obtained and performed the above history, physical examination, assessment and plan.   I have I have edited the above to reflec

## 2021-08-18 NOTE — DIETARY NOTE
[facility]     Admitting diagnosis:  Duodenal obstruction [K31.5]    Ht:  167.6 cm (5' 6\")  Wt: 65.8 kg (145 lb). Body mass index is 23.4 kg/m².   IBW: 65 kg    Wt Readings from Last 6 Encounters:  08/13/21 : 6

## 2021-08-18 NOTE — PROGRESS NOTES
[facility] Gastroenterology     Samaria Marshall Patient Status:  Inpatient    10/4/1932 MRN [id number]   [facility] 4NW-A Attending Mika Mitchell MD   [facility] Day # 6 PCP Oscar Soto ALKPHO 48  --  51  --   --  38*  --    AST 23  --  22  --   --  20  --    ALT 21  --  23  --   --  20  --    BILT 0.7  --  0.9  --   --  0.5  --    TP 6.2*  --  6.6  --   --  5.3*  --     < > = values in this interval not displayed.        No results for

## 2021-08-19 ENCOUNTER — APPOINTMENT (OUTPATIENT)
Dept: ULTRASOUND IMAGING | Facility: HOSPITAL | Age: 86
DRG: 380 | End: 2021-08-19
Attending: INTERNAL MEDICINE
Payer: OTHER GOVERNMENT

## 2021-08-19 VITALS
BODY MASS INDEX: 23 KG/M2 | HEART RATE: 66 BPM | TEMPERATURE: 98 F | OXYGEN SATURATION: 98 % | WEIGHT: 145 LBS | SYSTOLIC BLOOD PRESSURE: 172 MMHG | DIASTOLIC BLOOD PRESSURE: 69 MMHG | RESPIRATION RATE: 20 BRPM

## 2021-08-19 PROCEDURE — 93970 EXTREMITY STUDY: CPT | Performed by: INTERNAL MEDICINE

## 2021-08-19 RX ORDER — PANTOPRAZOLE SODIUM 40 MG/1
40 TABLET, DELAYED RELEASE ORAL
Qty: 84 TABLET | Refills: 0 | Status: SHIPPED | OUTPATIENT
Start: 2021-08-19 | End: 2021-09-30

## 2021-08-19 NOTE — CM/SW NOTE
08/19/21 1300   Discharge disposition   Expected discharge disposition Home or [facility]

## 2021-08-19 NOTE — PLAN OF CARE
Patient alert and oriented x4. Vital signs stable. Afebrile. Room air. No SOB. No chest pain. No nausea and vomiting. No abdominal pain. No Active GI bleeding noted. Soft diet tolerated well. Ambulates to the bathroom. Fall precautions in placed.  Call [facility] pain  - Anticipate increased pain with activity and pre-medicate as appropriate  Outcome: Progressing

## 2021-08-19 NOTE — PLAN OF CARE
Problem: GASTROINTESTINAL - ADULT  Goal: Minimal or absence of nausea and vomiting  Description: INTERVENTIONS:  - Maintain adequate hydration with IV or PO as ordered and tolerated  - Nasogastric tube to low intermittent suction as ordered  - Evaluate e nonpitting edema. Tenderness to left foot. Pt. Denies hx of gout. Dr. Ena Kennedy called and order received for US of BLE. P.T. evaluated pt and recommending home health for PT. Pt. Shila Mora to [facility]. KRISH arranged [facility].  Spoke to pt's dtr Malcolm Shirley, she

## 2021-08-19 NOTE — CM/SW NOTE
SW met w/pt regarding HH at [place]. Pt agreeable to [facility]. Provided pt w/quality data.  Info in AVS.

## 2021-08-19 NOTE — PROGRESS NOTES
S: patient seen and examined, no concerns.     O:  Physical Exam:   /69 (BP Location: Right arm)   Pulse 76   Temp 98.3 °F (36.8 °C) (Oral)   Resp 20   Wt 145 lb (65.8 kg)   SpO2 98%   BMI 23.40 kg/m²   Temp (24hrs), Av.6 °F (37 °C), Min:98.3 °F (

## 2021-08-19 NOTE — PROGRESS NOTES
S: patient seen and examined, no concerns.     O:  Physical Exam:   /52 (BP Location: Right arm)   Pulse 68   Temp 99 °F (37.2 °C) (Oral)   Resp 20   Wt 145 lb (65.8 kg)   SpO2 96%   BMI 23.40 kg/m²   Temp (24hrs), Av.7 °F (37.1 °C), Min:98.3 °F (

## 2021-08-27 NOTE — PAYOR COMM NOTE
Discharge Notification    Patient Name: Jeffory Severs  Payor: Saurav Mustafa #: [id number]  Authorization Number: [id number]  Admit Date/Time: 8/12/2021 7:35 AM  Discharge Date/Time: 8/19/2021 5:42 PM

## 2021-09-20 NOTE — DISCHARGE SUMMARY
[facility]    PATIENT'S NAME: Sydni Alfonso   ATTENDING PHYSICIAN: Mal Michael M.D.    PATIENT ACCOUNT#:   [de-identified]    LOCATION:  [address] [place] [address]  MEDICAL RECORD #:   [id number]       YOB: 1932  ADMISSION DATE:       08/12 b.i.d., [address] twice a day. DISCHARGE INSTRUCTIONS:  Patient advised to follow up in the office in 2 weeks. The patient was discharged home with Home Health for PT, OT and nursing care.     Dictated By Ashley Gramajo M.D.  d: 09/17/2021 17:10

## 2021-11-11 RX ORDER — PANTOPRAZOLE SODIUM 40 MG/1
40 TABLET, DELAYED RELEASE ORAL
COMMUNITY
End: 2022-02-04

## 2021-11-12 ENCOUNTER — LAB ENCOUNTER (OUTPATIENT)
Dept: LAB | Age: 86
End: 2021-11-12
Attending: STUDENT IN AN ORGANIZED HEALTH CARE EDUCATION/TRAINING PROGRAM
Payer: MEDICARE

## 2021-11-12 DIAGNOSIS — R10.9 ABDOMINAL PAIN OF UNKNOWN ETIOLOGY: ICD-10-CM

## 2021-11-15 ENCOUNTER — ANESTHESIA (OUTPATIENT)
Dept: ENDOSCOPY | Facility: HOSPITAL | Age: 86
End: 2021-11-15
Payer: MEDICARE

## 2021-11-15 ENCOUNTER — ANESTHESIA EVENT (OUTPATIENT)
Dept: ENDOSCOPY | Facility: HOSPITAL | Age: 86
End: 2021-11-15
Payer: MEDICARE

## 2021-11-15 ENCOUNTER — HOSPITAL ENCOUNTER (OUTPATIENT)
Facility: HOSPITAL | Age: 86
Setting detail: HOSPITAL OUTPATIENT SURGERY
Discharge: HOME | End: 2021-11-15
Attending: STUDENT IN AN ORGANIZED HEALTH CARE EDUCATION/TRAINING PROGRAM | Admitting: STUDENT IN AN ORGANIZED HEALTH CARE EDUCATION/TRAINING PROGRAM
Payer: MEDICARE

## 2021-11-15 VITALS
HEART RATE: 57 BPM | SYSTOLIC BLOOD PRESSURE: 173 MMHG | DIASTOLIC BLOOD PRESSURE: 89 MMHG | BODY MASS INDEX: 23.3 KG/M2 | WEIGHT: 145 LBS | OXYGEN SATURATION: 98 % | HEIGHT: 66 IN | RESPIRATION RATE: 16 BRPM | TEMPERATURE: 97 F

## 2021-11-15 DIAGNOSIS — R10.9 ABDOMINAL PAIN, UNSPECIFIED ABDOMINAL LOCATION: ICD-10-CM

## 2021-11-15 DIAGNOSIS — R10.9 ABDOMINAL PAIN OF UNKNOWN ETIOLOGY: Primary | ICD-10-CM

## 2021-11-15 PROCEDURE — 0DB68ZX EXCISION OF STOMACH, VIA NATURAL OR ARTIFICIAL OPENING ENDOSCOPIC, DIAGNOSTIC: ICD-10-PCS | Performed by: STUDENT IN AN ORGANIZED HEALTH CARE EDUCATION/TRAINING PROGRAM

## 2021-11-15 PROCEDURE — 88305 TISSUE EXAM BY PATHOLOGIST: CPT | Performed by: STUDENT IN AN ORGANIZED HEALTH CARE EDUCATION/TRAINING PROGRAM

## 2021-11-15 RX ORDER — LIDOCAINE HYDROCHLORIDE 10 MG/ML
INJECTION, SOLUTION EPIDURAL; INFILTRATION; INTRACAUDAL; PERINEURAL AS NEEDED
Status: DISCONTINUED | OUTPATIENT
Start: 2021-11-15 | End: 2021-11-15 | Stop reason: SURG

## 2021-11-15 RX ORDER — SODIUM CHLORIDE, SODIUM LACTATE, POTASSIUM CHLORIDE, CALCIUM CHLORIDE 600; 310; 30; 20 MG/100ML; MG/100ML; MG/100ML; MG/100ML
INJECTION, SOLUTION INTRAVENOUS CONTINUOUS
Status: DISCONTINUED | OUTPATIENT
Start: 2021-11-15 | End: 2021-11-15

## 2021-11-15 RX ADMIN — LIDOCAINE HYDROCHLORIDE 50 MG: 10 INJECTION, SOLUTION EPIDURAL; INFILTRATION; INTRACAUDAL; PERINEURAL at 15:09:00

## 2021-11-15 NOTE — H&P
[facility] Gastroenterology History and Physical Procedure Note    CC: EGD    History of Present Illness: Bird Allen is a 80year old male who presents for a  EGD.   Indication: nausea, vomiting, prior EGD with gastric ulcer with adherent clot - dx 8/17/2 History:   Procedure Laterality Date   • REPAIR ROTATOR CUFF,ACUTE      both shoulders       Family History:  History reviewed. No pertinent family history.     Social History:  Social History    Tobacco Use      Smoking status: Never Smoker      Smokeless bilaterally:  Common, deep, and superficial femoral, popliteal, sapheno-femoral junction, and posterior tibial veins. FINDINGS:    SAPHENOFEMORAL JUNCTION:  Unremarkable without evidence of thrombus  THROMBI:  None visible.   COMPRESSION:  Normal compre

## 2021-11-15 NOTE — ANESTHESIA POSTPROCEDURE EVALUATION
[address] Patient Status:  Hospital Outpatient Surgery   Age/Gender 80year old male MRN [id number]   Location [id number] [place] [address] Attending Angel Bañuelos MD   Hosp Day # 0 PCP MD Corby Craig

## 2021-11-15 NOTE — ANESTHESIA PREPROCEDURE EVALUATION
PRE-OP EVALUATION    Patient Name: Oscar Rodarte    Admit Diagnosis: Abdominal pain, unspecified abdominal location [R10.9]    Pre-op Diagnosis: Abdominal pain, unspecified abdominal location [R10.9]    ESOPHAGOGASTRODUODENOSCOPY (EGD)    Anesthesia Proce MCHC 32.2 08/18/2021    RDW 15.2 08/18/2021    .0 (L) 08/18/2021     Lab Results   Component Value Date     (H) 08/17/2021    K 3.5 08/18/2021     (H) 08/17/2021    CO2 26.0 08/17/2021    BUN 9 08/17/2021    CREATSERUM 0.99 08/17/2021

## 2021-11-15 NOTE — H&P
EGD Operative Report  Patient Name: Oscar Rodarte  YOB: 1932  MRN: [id number]  Procedure: Esophagogastroduodenoscopy (EGD)  With biopsies  Pre-operative Diagnosis & Indication:   1. Gastric Ulcer Surveillance   Post-operative Diagnosis:   1. adequate sedation was achieved, a bite block was placed and a lubricated tip of an Olympus video upper endoscope was inserted through the oropharynx and gently manipulated through the esophagus into the stomach and the second portion of the duodenum.  Upon reactions   - Follow-up biopsies  - PPI once daily  - Avoid non-aspirin NSAIDs    Porsche Stoll MD  11/15/2021  3:22 PM

## 2021-11-15 NOTE — OPERATIVE REPORT
EGD Operative Report  Patient Name: Radha Virk  YOB: 1932  MRN: [id number]  Procedure: Esophagogastroduodenoscopy (EGD)  With biopsies  Pre-operative Diagnosis & Indication:   1. Gastric Ulcer Surveillance   Post-operative Diagnosis:   1. adequate sedation was achieved, a bite block was placed and a lubricated tip of an Olympus video upper endoscope was inserted through the oropharynx and gently manipulated through the esophagus into the stomach and the second portion of the duodenum.  Upon reactions   · Follow-up biopsies  · PPI once daily  · Avoid non-aspirin NSAIDs     Martin Guzman MD  11/15/2021  3:22 PM

## 2021-12-04 NOTE — PROGRESS NOTES
Eusebio Gunter recently had an upper endoscopy (EGD) procedure completed with biopsies of the stomach. The biopsies of the stomach show there are some reactive changes in the lining of your stomach. There is NO signs of an infection.      Please call

## 2022-01-20 ENCOUNTER — HOSPITAL ENCOUNTER (EMERGENCY)
Facility: HOSPITAL | Age: 87
Discharge: HOME | End: 2022-01-20
Attending: EMERGENCY MEDICINE
Payer: MEDICARE

## 2022-01-20 ENCOUNTER — APPOINTMENT (OUTPATIENT)
Dept: CT IMAGING | Facility: HOSPITAL | Age: 87
End: 2022-01-20
Attending: EMERGENCY MEDICINE
Payer: MEDICARE

## 2022-01-20 VITALS
HEART RATE: 77 BPM | HEIGHT: 66 IN | TEMPERATURE: 98 F | OXYGEN SATURATION: 98 % | SYSTOLIC BLOOD PRESSURE: 133 MMHG | WEIGHT: 150 LBS | DIASTOLIC BLOOD PRESSURE: 72 MMHG | RESPIRATION RATE: 20 BRPM | BODY MASS INDEX: 24.11 KG/M2

## 2022-01-20 DIAGNOSIS — E87.3 METABOLIC ALKALOSIS: ICD-10-CM

## 2022-01-20 DIAGNOSIS — R11.2 NAUSEA AND VOMITING IN ADULT: Primary | ICD-10-CM

## 2022-01-20 DIAGNOSIS — E87.0 HYPERNATREMIA: ICD-10-CM

## 2022-01-20 DIAGNOSIS — N17.9 ACUTE RENAL INJURY (HCC): ICD-10-CM

## 2022-01-20 LAB
ALBUMIN SERPL-MCNC: 4.3 G/DL (ref 3.4–5)
ALBUMIN/GLOB SERPL: 1.1 {RATIO} (ref 1–2)
ALP LIVER SERPL-CCNC: 64 U/L
ALT SERPL-CCNC: 29 U/L
ANION GAP SERPL CALC-SCNC: 6 MMOL/L (ref 0–18)
AST SERPL-CCNC: 26 U/L (ref 15–37)
ATRIAL RATE: 85 BPM
BASOPHILS # BLD AUTO: 0.05 X10(3) UL (ref 0–0.2)
BASOPHILS NFR BLD AUTO: 0.4 %
BILIRUB SERPL-MCNC: 0.5 MG/DL (ref 0.1–2)
BILIRUB UR QL STRIP.AUTO: NEGATIVE
BUN BLD-MCNC: 29 MG/DL (ref 7–18)
CALCIUM BLD-MCNC: 10.6 MG/DL (ref 8.5–10.1)
CHLORIDE SERPL-SCNC: 104 MMOL/L (ref 98–112)
CLARITY UR REFRACT.AUTO: CLEAR
CO2 SERPL-SCNC: 35 MMOL/L (ref 21–32)
COLOR UR AUTO: YELLOW
CREAT BLD-MCNC: 1.45 MG/DL
EOSINOPHIL # BLD AUTO: 0.01 X10(3) UL (ref 0–0.7)
EOSINOPHIL NFR BLD AUTO: 0.1 %
ERYTHROCYTE [DISTWIDTH] IN BLOOD BY AUTOMATED COUNT: 14.9 %
GLOBULIN PLAS-MCNC: 4 G/DL (ref 2.8–4.4)
GLUCOSE BLD-MCNC: 147 MG/DL (ref 70–99)
GLUCOSE UR STRIP.AUTO-MCNC: NEGATIVE MG/DL
HCT VFR BLD AUTO: 39.7 %
HGB BLD-MCNC: 12 G/DL
IMM GRANULOCYTES # BLD AUTO: 0.04 X10(3) UL (ref 0–1)
IMM GRANULOCYTES NFR BLD: 0.3 %
KETONES UR STRIP.AUTO-MCNC: NEGATIVE MG/DL
LEUKOCYTE ESTERASE UR QL STRIP.AUTO: NEGATIVE
LYMPHOCYTES # BLD AUTO: 1.4 X10(3) UL (ref 1–4)
LYMPHOCYTES NFR BLD AUTO: 12.1 %
MCH RBC QN AUTO: 22.3 PG (ref 26–34)
MCHC RBC AUTO-ENTMCNC: 30.2 G/DL (ref 31–37)
MCV RBC AUTO: 73.9 FL
MONOCYTES # BLD AUTO: 0.83 X10(3) UL (ref 0.1–1)
MONOCYTES NFR BLD AUTO: 7.2 %
NEUTROPHILS # BLD AUTO: 9.26 X10 (3) UL (ref 1.5–7.7)
NEUTROPHILS # BLD AUTO: 9.26 X10(3) UL (ref 1.5–7.7)
NEUTROPHILS NFR BLD AUTO: 79.9 %
NITRITE UR QL STRIP.AUTO: NEGATIVE
OSMOLALITY SERPL CALC.SUM OF ELEC: 309 MOSM/KG (ref 275–295)
P AXIS: 82 DEGREES
P-R INTERVAL: 140 MS
PH UR STRIP.AUTO: 7 [PH] (ref 5–8)
PLATELET # BLD AUTO: 244 10(3)UL (ref 150–450)
POTASSIUM SERPL-SCNC: 4.6 MMOL/L (ref 3.5–5.1)
PROT SERPL-MCNC: 8.3 G/DL (ref 6.4–8.2)
PROT UR STRIP.AUTO-MCNC: NEGATIVE MG/DL
Q-T INTERVAL: 372 MS
QRS DURATION: 76 MS
QTC CALCULATION (BEZET): 442 MS
R AXIS: 27 DEGREES
RBC # BLD AUTO: 5.37 X10(6)UL
RBC UR QL AUTO: NEGATIVE
SARS-COV-2 RNA RESP QL NAA+PROBE: NOT DETECTED
SODIUM SERPL-SCNC: 145 MMOL/L (ref 136–145)
SP GR UR STRIP.AUTO: >1.03 (ref 1–1.03)
T AXIS: 16 DEGREES
TROPONIN I HIGH SENSITIVITY: 8 NG/L
UROBILINOGEN UR STRIP.AUTO-MCNC: <2 MG/DL
VENTRICULAR RATE: 85 BPM
WBC # BLD AUTO: 11.6 X10(3) UL (ref 4–11)

## 2022-01-20 PROCEDURE — 96361 HYDRATE IV INFUSION ADD-ON: CPT

## 2022-01-20 PROCEDURE — 81003 URINALYSIS AUTO W/O SCOPE: CPT | Performed by: EMERGENCY MEDICINE

## 2022-01-20 PROCEDURE — 74177 CT ABD & PELVIS W/CONTRAST: CPT | Performed by: EMERGENCY MEDICINE

## 2022-01-20 PROCEDURE — 84484 ASSAY OF TROPONIN QUANT: CPT | Performed by: EMERGENCY MEDICINE

## 2022-01-20 PROCEDURE — 99284 EMERGENCY DEPT VISIT MOD MDM: CPT

## 2022-01-20 PROCEDURE — 85025 COMPLETE CBC W/AUTO DIFF WBC: CPT | Performed by: EMERGENCY MEDICINE

## 2022-01-20 PROCEDURE — 80053 COMPREHEN METABOLIC PANEL: CPT | Performed by: EMERGENCY MEDICINE

## 2022-01-20 PROCEDURE — 93010 ELECTROCARDIOGRAM REPORT: CPT

## 2022-01-20 PROCEDURE — 96374 THER/PROPH/DIAG INJ IV PUSH: CPT

## 2022-01-20 PROCEDURE — 93005 ELECTROCARDIOGRAM TRACING: CPT

## 2022-01-20 RX ORDER — ONDANSETRON 2 MG/ML
4 INJECTION INTRAMUSCULAR; INTRAVENOUS ONCE
Status: COMPLETED | OUTPATIENT
Start: 2022-01-20 | End: 2022-01-20

## 2022-01-20 RX ORDER — SODIUM CHLORIDE 9 MG/ML
125 INJECTION, SOLUTION INTRAVENOUS CONTINUOUS
Status: DISCONTINUED | OUTPATIENT
Start: 2022-01-20 | End: 2022-01-20

## 2022-01-20 RX ORDER — ONDANSETRON 4 MG/1
4 TABLET, ORALLY DISINTEGRATING ORAL EVERY 4 HOURS PRN
Qty: 10 TABLET | Refills: 0 | Status: SHIPPED | OUTPATIENT
Start: 2022-01-20 | End: 2022-01-27

## 2022-01-20 NOTE — ED INITIAL ASSESSMENT (HPI)
Pt c/o diffused abd pain for 4-5 hrs, \"after eating [employer]. \" States last BM was yesterday. Pt c/o nausea, no vomiting.  He denies urinary sxs

## 2022-01-20 NOTE — ED QUICK NOTES
Pt vomited yellowish/brownish emesis, about 1/2 bucket full, approx 3Liters. Reports feeling much better after.

## 2022-01-20 NOTE — ED PROVIDER NOTES
Patient Seen in: [facility] Emergency Department      History   Patient presents with:  Abdomen/Flank Pain    Stated Complaint: abd pain after eating [employer] last night    Subjective:   LIYAH Jarrett [place] is a pleasant 19-year-old male presenting with COMP METABOLIC PANEL (14) - Abnormal; Notable for the following components:       Result Value    Glucose 147 (*)     CO2 35.0 (*)     BUN 29 (*)     Creatinine 1.45 (*)     Calcium, Total 10.6 (*)     Calculated Osmolality 309 (*)     GFR, Non- A ruling out obstructive process                              Disposition and Plan     Clinical Impression:  Nausea and vomiting in adult  (primary encounter diagnosis)  Hypernatremia  Acute renal injury ([facility] [address]  Metabolic alkalosis     Disposition:  Discharge

## 2022-05-02 ENCOUNTER — HOSPITAL ENCOUNTER (EMERGENCY)
Facility: HOSPITAL | Age: 87
Discharge: HOME | End: 2022-05-02
Attending: EMERGENCY MEDICINE
Payer: MEDICARE

## 2022-05-02 VITALS
SYSTOLIC BLOOD PRESSURE: 179 MMHG | WEIGHT: 145 LBS | TEMPERATURE: 98 F | RESPIRATION RATE: 14 BRPM | HEART RATE: 58 BPM | DIASTOLIC BLOOD PRESSURE: 76 MMHG | BODY MASS INDEX: 23 KG/M2 | OXYGEN SATURATION: 99 %

## 2022-05-02 DIAGNOSIS — K59.00 CONSTIPATION, UNSPECIFIED CONSTIPATION TYPE: Primary | ICD-10-CM

## 2022-05-02 PROCEDURE — 99283 EMERGENCY DEPT VISIT LOW MDM: CPT

## 2022-05-02 RX ORDER — SODIUM PHOSPHATE, DIBASIC AND SODIUM PHOSPHATE, MONOBASIC 7; 19 G/133ML; G/133ML
1 ENEMA RECTAL ONCE AS NEEDED
Qty: 3 ENEMA | Refills: 0 | Status: SHIPPED | OUTPATIENT
Start: 2022-05-02 | End: 2022-05-02

## 2022-06-05 ENCOUNTER — WALK IN (OUTPATIENT)
Dept: URGENT CARE | Age: 87
End: 2022-06-05

## 2022-06-05 VITALS
TEMPERATURE: 98 F | RESPIRATION RATE: 16 BRPM | HEART RATE: 60 BPM | OXYGEN SATURATION: 99 % | DIASTOLIC BLOOD PRESSURE: 70 MMHG | SYSTOLIC BLOOD PRESSURE: 132 MMHG

## 2022-06-05 DIAGNOSIS — Z76.89 ENCOUNTER TO ESTABLISH CARE: ICD-10-CM

## 2022-06-05 DIAGNOSIS — Z20.822 SUSPECTED COVID-19 VIRUS INFECTION: Primary | ICD-10-CM

## 2022-06-05 DIAGNOSIS — J06.9 UPPER RESPIRATORY TRACT INFECTION, UNSPECIFIED TYPE: ICD-10-CM

## 2022-06-05 LAB
INTERNAL PROCEDURAL CONTROLS ACCEPTABLE: YES
SARS-COV+SARS-COV-2 AG RESP QL IA.RAPID: NOT DETECTED
TEST LOT EXPIRATION DATE: NORMAL
TEST LOT NUMBER: NORMAL

## 2022-06-05 PROCEDURE — 99213 OFFICE O/P EST LOW 20 MIN: CPT | Performed by: FAMILY MEDICINE

## 2022-06-05 PROCEDURE — 87426 SARSCOV CORONAVIRUS AG IA: CPT | Performed by: FAMILY MEDICINE

## 2022-06-15 ENCOUNTER — TELEPHONE (OUTPATIENT)
Dept: INTERNAL MEDICINE | Age: 87
End: 2022-06-15

## 2022-10-28 ENCOUNTER — WALK IN (OUTPATIENT)
Dept: URGENT CARE | Age: 87
End: 2022-10-28

## 2022-10-28 VITALS
RESPIRATION RATE: 20 BRPM | TEMPERATURE: 97.2 F | OXYGEN SATURATION: 99 % | SYSTOLIC BLOOD PRESSURE: 138 MMHG | DIASTOLIC BLOOD PRESSURE: 78 MMHG | HEART RATE: 58 BPM

## 2022-10-28 DIAGNOSIS — Z20.822 SUSPECTED COVID-19 VIRUS INFECTION: ICD-10-CM

## 2022-10-28 DIAGNOSIS — R05.1 ACUTE COUGH: Primary | ICD-10-CM

## 2022-10-28 PROCEDURE — 87426 SARSCOV CORONAVIRUS AG IA: CPT | Performed by: FAMILY MEDICINE

## 2022-10-28 PROCEDURE — 99214 OFFICE O/P EST MOD 30 MIN: CPT | Performed by: FAMILY MEDICINE

## 2022-10-28 RX ORDER — FERROUS SULFATE 324(65)MG
1 TABLET, DELAYED RELEASE (ENTERIC COATED) ORAL DAILY
COMMUNITY

## 2022-10-28 RX ORDER — POLYETHYLENE GLYCOL 3350 17 G/17G
POWDER, FOR SOLUTION ORAL
COMMUNITY
Start: 2022-06-28

## 2022-10-28 RX ORDER — AMOXICILLIN 875 MG/1
875 TABLET, COATED ORAL 2 TIMES DAILY
Qty: 20 TABLET | Refills: 0 | Status: SHIPPED | OUTPATIENT
Start: 2022-10-28 | End: 2022-11-07

## 2022-10-28 RX ORDER — DEXTROMETHORPHAN HYDROBROMIDE AND PROMETHAZINE HYDROCHLORIDE 15; 6.25 MG/5ML; MG/5ML
5 SYRUP ORAL 4 TIMES DAILY PRN
Qty: 240 ML | Refills: 0 | Status: SHIPPED | OUTPATIENT
Start: 2022-10-28 | End: 2022-11-07

## 2022-10-28 RX ORDER — DORZOLAMIDE HYDROCHLORIDE AND TIMOLOL MALEATE 20; 5 MG/ML; MG/ML
SOLUTION/ DROPS OPHTHALMIC
COMMUNITY
Start: 2022-10-07

## 2023-02-24 ENCOUNTER — WALK IN (OUTPATIENT)
Dept: URGENT CARE | Age: 88
End: 2023-02-24

## 2023-02-24 VITALS
SYSTOLIC BLOOD PRESSURE: 156 MMHG | HEART RATE: 72 BPM | TEMPERATURE: 98.3 F | DIASTOLIC BLOOD PRESSURE: 84 MMHG | OXYGEN SATURATION: 98 % | RESPIRATION RATE: 20 BRPM

## 2023-02-24 DIAGNOSIS — R05.9 COUGH, UNSPECIFIED TYPE: ICD-10-CM

## 2023-02-24 DIAGNOSIS — Z20.822 SUSPECTED COVID-19 VIRUS INFECTION: ICD-10-CM

## 2023-02-24 DIAGNOSIS — U07.1 COVID-19 VIRUS INFECTION: Primary | ICD-10-CM

## 2023-02-24 LAB
FLUAV AG UPPER RESP QL IA.RAPID: NEGATIVE
FLUBV AG UPPER RESP QL IA.RAPID: NEGATIVE
SARS-COV+SARS-COV-2 AG RESP QL IA.RAPID: DETECTED
TEST LOT EXPIRATION DATE: ABNORMAL
TEST LOT NUMBER: ABNORMAL

## 2023-02-24 PROCEDURE — 87428 SARSCOV & INF VIR A&B AG IA: CPT | Performed by: FAMILY MEDICINE

## 2023-02-24 PROCEDURE — 99214 OFFICE O/P EST MOD 30 MIN: CPT | Performed by: FAMILY MEDICINE

## 2023-02-24 ASSESSMENT — PAIN SCALES - GENERAL: PAINLEVEL: 0

## 2023-03-01 ENCOUNTER — WALK IN (OUTPATIENT)
Dept: URGENT CARE | Age: 88
End: 2023-03-01

## 2023-03-01 VITALS
SYSTOLIC BLOOD PRESSURE: 128 MMHG | HEART RATE: 62 BPM | OXYGEN SATURATION: 99 % | TEMPERATURE: 96.7 F | RESPIRATION RATE: 22 BRPM | DIASTOLIC BLOOD PRESSURE: 60 MMHG

## 2023-03-01 DIAGNOSIS — J40 BRONCHITIS: Primary | ICD-10-CM

## 2023-03-01 PROCEDURE — 99214 OFFICE O/P EST MOD 30 MIN: CPT | Performed by: EMERGENCY MEDICINE

## 2023-03-01 RX ORDER — PREDNISONE 20 MG/1
40 TABLET ORAL DAILY
Qty: 10 TABLET | Refills: 0 | Status: SHIPPED | OUTPATIENT
Start: 2023-03-01 | End: 2023-03-06

## 2023-03-01 RX ORDER — AZITHROMYCIN 500 MG/1
500 TABLET, FILM COATED ORAL DAILY
Qty: 5 TABLET | Refills: 0 | Status: SHIPPED | OUTPATIENT
Start: 2023-03-01

## 2023-03-06 ENCOUNTER — WALK IN (OUTPATIENT)
Dept: URGENT CARE | Age: 88
End: 2023-03-06

## 2023-03-06 VITALS
HEART RATE: 72 BPM | SYSTOLIC BLOOD PRESSURE: 128 MMHG | TEMPERATURE: 98 F | RESPIRATION RATE: 18 BRPM | OXYGEN SATURATION: 99 % | DIASTOLIC BLOOD PRESSURE: 76 MMHG

## 2023-03-06 DIAGNOSIS — R05.1 ACUTE COUGH: Primary | ICD-10-CM

## 2023-03-06 PROCEDURE — 99214 OFFICE O/P EST MOD 30 MIN: CPT | Performed by: FAMILY MEDICINE

## 2023-03-06 RX ORDER — BENZONATATE 200 MG/1
200 CAPSULE ORAL 3 TIMES DAILY PRN
Qty: 20 CAPSULE | Refills: 0 | Status: SHIPPED | OUTPATIENT
Start: 2023-03-06

## 2023-03-10 ENCOUNTER — WALK IN (OUTPATIENT)
Dept: URGENT CARE | Age: 88
End: 2023-03-10

## 2023-03-10 VITALS
TEMPERATURE: 96.6 F | RESPIRATION RATE: 18 BRPM | DIASTOLIC BLOOD PRESSURE: 50 MMHG | HEART RATE: 71 BPM | OXYGEN SATURATION: 97 % | SYSTOLIC BLOOD PRESSURE: 100 MMHG

## 2023-03-10 DIAGNOSIS — J06.9 ACUTE UPPER RESPIRATORY INFECTION, UNSPECIFIED: Primary | ICD-10-CM

## 2023-03-10 DIAGNOSIS — Z76.89 ENCOUNTER TO ESTABLISH CARE: ICD-10-CM

## 2023-03-10 DIAGNOSIS — J01.90 ACUTE SINUSITIS, RECURRENCE NOT SPECIFIED, UNSPECIFIED LOCATION: ICD-10-CM

## 2023-03-10 DIAGNOSIS — R68.2 DRY MOUTH: ICD-10-CM

## 2023-03-10 PROCEDURE — 99214 OFFICE O/P EST MOD 30 MIN: CPT | Performed by: INTERNAL MEDICINE

## 2023-03-10 RX ORDER — CEFDINIR 300 MG/1
300 CAPSULE ORAL 2 TIMES DAILY
Qty: 20 CAPSULE | Refills: 0 | Status: SHIPPED | OUTPATIENT
Start: 2023-03-10 | End: 2023-03-11 | Stop reason: ALTCHOICE

## 2023-03-11 ENCOUNTER — TELEPHONE (OUTPATIENT)
Dept: URGENT CARE | Age: 88
End: 2023-03-11

## 2023-03-11 RX ORDER — AMOXICILLIN 875 MG/1
875 TABLET, COATED ORAL 2 TIMES DAILY
Qty: 20 TABLET | Refills: 0 | Status: SHIPPED | OUTPATIENT
Start: 2023-03-11 | End: 2023-03-21

## 2023-03-13 ENCOUNTER — TELEPHONE (OUTPATIENT)
Dept: INTERNAL MEDICINE | Age: 88
End: 2023-03-13

## 2023-05-23 PROCEDURE — 93010 ELECTROCARDIOGRAM REPORT: CPT

## 2023-05-23 PROCEDURE — 99285 EMERGENCY DEPT VISIT HI MDM: CPT

## 2023-05-24 ENCOUNTER — APPOINTMENT (OUTPATIENT)
Dept: CT IMAGING | Facility: HOSPITAL | Age: 88
End: 2023-05-24
Attending: EMERGENCY MEDICINE
Payer: MEDICARE

## 2023-05-24 ENCOUNTER — APPOINTMENT (OUTPATIENT)
Dept: GENERAL RADIOLOGY | Facility: HOSPITAL | Age: 88
End: 2023-05-24
Attending: EMERGENCY MEDICINE
Payer: MEDICARE

## 2023-05-24 ENCOUNTER — HOSPITAL ENCOUNTER (EMERGENCY)
Facility: HOSPITAL | Age: 88
Discharge: HOME | End: 2023-05-24
Attending: EMERGENCY MEDICINE
Payer: MEDICARE

## 2023-05-24 VITALS
BODY MASS INDEX: 22.5 KG/M2 | DIASTOLIC BLOOD PRESSURE: 98 MMHG | RESPIRATION RATE: 18 BRPM | HEIGHT: 66 IN | TEMPERATURE: 98 F | SYSTOLIC BLOOD PRESSURE: 152 MMHG | HEART RATE: 59 BPM | OXYGEN SATURATION: 98 % | WEIGHT: 140 LBS

## 2023-05-24 DIAGNOSIS — S39.012A STRAIN OF LUMBAR REGION, INITIAL ENCOUNTER: Primary | ICD-10-CM

## 2023-05-24 DIAGNOSIS — N13.4 HYDROURETER: ICD-10-CM

## 2023-05-24 LAB
ALBUMIN SERPL-MCNC: 4.2 G/DL (ref 3.4–5)
ALBUMIN/GLOB SERPL: 1.1 {RATIO} (ref 1–2)
ALP LIVER SERPL-CCNC: 64 U/L
ALT SERPL-CCNC: 30 U/L
ANION GAP SERPL CALC-SCNC: 6 MMOL/L (ref 0–18)
AST SERPL-CCNC: 41 U/L (ref 15–37)
ATRIAL RATE: 60 BPM
BASOPHILS # BLD AUTO: 0.06 X10(3) UL (ref 0–0.2)
BASOPHILS NFR BLD AUTO: 0.9 %
BILIRUB SERPL-MCNC: 0.5 MG/DL (ref 0.1–2)
BILIRUB UR QL STRIP.AUTO: NEGATIVE
BUN BLD-MCNC: 17 MG/DL (ref 7–18)
CALCIUM BLD-MCNC: 9.7 MG/DL (ref 8.5–10.1)
CHLORIDE SERPL-SCNC: 113 MMOL/L (ref 98–112)
CLARITY UR REFRACT.AUTO: CLEAR
CO2 SERPL-SCNC: 22 MMOL/L (ref 21–32)
CREAT BLD-MCNC: 1.17 MG/DL
EOSINOPHIL # BLD AUTO: 0.16 X10(3) UL (ref 0–0.7)
EOSINOPHIL NFR BLD AUTO: 2.4 %
ERYTHROCYTE [DISTWIDTH] IN BLOOD BY AUTOMATED COUNT: 15.9 %
GFR SERPLBLD BASED ON 1.73 SQ M-ARVRAT: 59 ML/MIN/1.73M2 (ref 60–?)
GLOBULIN PLAS-MCNC: 3.9 G/DL (ref 2.8–4.4)
GLUCOSE BLD-MCNC: 90 MG/DL (ref 70–99)
GLUCOSE UR STRIP.AUTO-MCNC: NEGATIVE MG/DL
HCT VFR BLD AUTO: 41.1 %
HGB BLD-MCNC: 12.8 G/DL
IMM GRANULOCYTES # BLD AUTO: 0.03 X10(3) UL (ref 0–1)
IMM GRANULOCYTES NFR BLD: 0.4 %
KETONES UR STRIP.AUTO-MCNC: NEGATIVE MG/DL
LEUKOCYTE ESTERASE UR QL STRIP.AUTO: NEGATIVE
LYMPHOCYTES # BLD AUTO: 2.28 X10(3) UL (ref 1–4)
LYMPHOCYTES NFR BLD AUTO: 33.9 %
MCH RBC QN AUTO: 23.2 PG (ref 26–34)
MCHC RBC AUTO-ENTMCNC: 31.1 G/DL (ref 31–37)
MCV RBC AUTO: 74.6 FL
MONOCYTES # BLD AUTO: 0.89 X10(3) UL (ref 0.1–1)
MONOCYTES NFR BLD AUTO: 13.2 %
NEUTROPHILS # BLD AUTO: 3.31 X10 (3) UL (ref 1.5–7.7)
NEUTROPHILS # BLD AUTO: 3.31 X10(3) UL (ref 1.5–7.7)
NEUTROPHILS NFR BLD AUTO: 49.2 %
NITRITE UR QL STRIP.AUTO: NEGATIVE
OSMOLALITY SERPL CALC.SUM OF ELEC: 293 MOSM/KG (ref 275–295)
P AXIS: 76 DEGREES
P-R INTERVAL: 142 MS
PH UR STRIP.AUTO: 5 [PH] (ref 5–8)
PLATELET # BLD AUTO: 198 10(3)UL (ref 150–450)
POTASSIUM SERPL-SCNC: 4.8 MMOL/L (ref 3.5–5.1)
PROT SERPL-MCNC: 8.1 G/DL (ref 6.4–8.2)
PROT UR STRIP.AUTO-MCNC: NEGATIVE MG/DL
Q-T INTERVAL: 406 MS
QRS DURATION: 70 MS
QTC CALCULATION (BEZET): 406 MS
R AXIS: -7 DEGREES
RBC # BLD AUTO: 5.51 X10(6)UL
SODIUM SERPL-SCNC: 141 MMOL/L (ref 136–145)
SP GR UR STRIP.AUTO: 1.01 (ref 1–1.03)
T AXIS: 39 DEGREES
TROPONIN I HIGH SENSITIVITY: 11 NG/L
UROBILINOGEN UR STRIP.AUTO-MCNC: <2 MG/DL
VENTRICULAR RATE: 60 BPM
WBC # BLD AUTO: 6.7 X10(3) UL (ref 4–11)

## 2023-05-24 PROCEDURE — 93005 ELECTROCARDIOGRAM TRACING: CPT

## 2023-05-24 PROCEDURE — 84484 ASSAY OF TROPONIN QUANT: CPT | Performed by: EMERGENCY MEDICINE

## 2023-05-24 PROCEDURE — 85025 COMPLETE CBC W/AUTO DIFF WBC: CPT | Performed by: EMERGENCY MEDICINE

## 2023-05-24 PROCEDURE — 72110 X-RAY EXAM L-2 SPINE 4/>VWS: CPT | Performed by: EMERGENCY MEDICINE

## 2023-05-24 PROCEDURE — 81001 URINALYSIS AUTO W/SCOPE: CPT | Performed by: EMERGENCY MEDICINE

## 2023-05-24 PROCEDURE — 74176 CT ABD & PELVIS W/O CONTRAST: CPT | Performed by: EMERGENCY MEDICINE

## 2023-05-24 PROCEDURE — 96374 THER/PROPH/DIAG INJ IV PUSH: CPT

## 2023-05-24 PROCEDURE — 80053 COMPREHEN METABOLIC PANEL: CPT | Performed by: EMERGENCY MEDICINE

## 2023-05-24 PROCEDURE — 71045 X-RAY EXAM CHEST 1 VIEW: CPT | Performed by: EMERGENCY MEDICINE

## 2023-05-24 RX ORDER — IBUPROFEN 600 MG/1
600 TABLET ORAL EVERY 8 HOURS PRN
Qty: 30 TABLET | Refills: 0 | Status: SHIPPED | OUTPATIENT
Start: 2023-05-24 | End: 2023-06-03

## 2023-05-24 RX ORDER — KETOROLAC TROMETHAMINE 15 MG/ML
15 INJECTION, SOLUTION INTRAMUSCULAR; INTRAVENOUS ONCE
Status: COMPLETED | OUTPATIENT
Start: 2023-05-24 | End: 2023-05-24

## 2023-07-06 ENCOUNTER — WALK IN (OUTPATIENT)
Dept: URGENT CARE | Age: 88
End: 2023-07-06

## 2023-07-06 ENCOUNTER — IMAGING SERVICES (OUTPATIENT)
Dept: GENERAL RADIOLOGY | Age: 88
End: 2023-07-06
Attending: FAMILY MEDICINE

## 2023-07-06 VITALS
RESPIRATION RATE: 18 BRPM | TEMPERATURE: 98.3 F | SYSTOLIC BLOOD PRESSURE: 118 MMHG | DIASTOLIC BLOOD PRESSURE: 56 MMHG | HEART RATE: 73 BPM | OXYGEN SATURATION: 98 %

## 2023-07-06 DIAGNOSIS — M25.531 RIGHT WRIST PAIN: Primary | ICD-10-CM

## 2023-07-06 DIAGNOSIS — M25.531 RIGHT WRIST PAIN: ICD-10-CM

## 2023-07-06 PROCEDURE — 73110 X-RAY EXAM OF WRIST: CPT | Performed by: RADIOLOGY

## 2023-07-06 PROCEDURE — 99214 OFFICE O/P EST MOD 30 MIN: CPT | Performed by: FAMILY MEDICINE

## 2023-07-06 RX ORDER — NAPROXEN 500 MG/1
500 TABLET ORAL 2 TIMES DAILY PRN
Qty: 20 TABLET | Refills: 0 | Status: SHIPPED | OUTPATIENT
Start: 2023-07-06

## 2023-08-12 ENCOUNTER — WALK IN (OUTPATIENT)
Dept: URGENT CARE | Age: 88
End: 2023-08-12

## 2023-08-12 VITALS
TEMPERATURE: 97.3 F | SYSTOLIC BLOOD PRESSURE: 120 MMHG | HEART RATE: 55 BPM | DIASTOLIC BLOOD PRESSURE: 54 MMHG | RESPIRATION RATE: 20 BRPM | OXYGEN SATURATION: 98 %

## 2023-08-12 DIAGNOSIS — J02.0 STREP PHARYNGITIS: Primary | ICD-10-CM

## 2023-08-12 DIAGNOSIS — J02.9 SORE THROAT: ICD-10-CM

## 2023-08-12 LAB
FLUAV AG UPPER RESP QL IA.RAPID: NEGATIVE
FLUBV AG UPPER RESP QL IA.RAPID: NEGATIVE
INTERNAL PROCEDURAL CONTROLS ACCEPTABLE: YES
S PYO AG THROAT QL IA.RAPID: POSITIVE
SARS-COV+SARS-COV-2 AG RESP QL IA.RAPID: NOT DETECTED
TEST LOT EXPIRATION DATE: ABNORMAL
TEST LOT EXPIRATION DATE: NORMAL
TEST LOT NUMBER: ABNORMAL
TEST LOT NUMBER: NORMAL

## 2023-08-12 PROCEDURE — 99214 OFFICE O/P EST MOD 30 MIN: CPT | Performed by: FAMILY MEDICINE

## 2023-08-12 PROCEDURE — 87880 STREP A ASSAY W/OPTIC: CPT | Performed by: FAMILY MEDICINE

## 2023-08-12 PROCEDURE — 87428 SARSCOV & INF VIR A&B AG IA: CPT | Performed by: FAMILY MEDICINE

## 2023-08-12 RX ORDER — AMOXICILLIN 875 MG/1
875 TABLET, COATED ORAL 2 TIMES DAILY
Qty: 14 TABLET | Refills: 0 | Status: SHIPPED | OUTPATIENT
Start: 2023-08-12 | End: 2023-08-19

## 2023-08-12 ASSESSMENT — ENCOUNTER SYMPTOMS
NAUSEA: 0
HEADACHES: 0
SWOLLEN GLANDS: 0
FEVER: 0
FATIGUE: 0
SINUS PAIN: 0
SORE THROAT: 1
SHORTNESS OF BREATH: 0
VOMITING: 0
CHILLS: 0
ABDOMINAL PAIN: 0
SINUS PRESSURE: 0
RHINORRHEA: 0
COUGH: 0

## 2024-01-05 ENCOUNTER — WALK IN (OUTPATIENT)
Dept: URGENT CARE | Age: 89
End: 2024-01-05

## 2024-01-05 VITALS
OXYGEN SATURATION: 99 % | RESPIRATION RATE: 18 BRPM | HEART RATE: 69 BPM | DIASTOLIC BLOOD PRESSURE: 68 MMHG | TEMPERATURE: 98.1 F | SYSTOLIC BLOOD PRESSURE: 139 MMHG

## 2024-01-05 DIAGNOSIS — J06.9 URI, ACUTE: Primary | ICD-10-CM

## 2024-01-05 PROCEDURE — 99214 OFFICE O/P EST MOD 30 MIN: CPT | Performed by: FAMILY MEDICINE

## 2024-01-05 RX ORDER — AMOXICILLIN 875 MG/1
875 TABLET, COATED ORAL 2 TIMES DAILY
Qty: 20 TABLET | Refills: 0 | Status: SHIPPED | OUTPATIENT
Start: 2024-01-05 | End: 2024-01-15

## 2024-01-07 ENCOUNTER — HOSPITAL ENCOUNTER (EMERGENCY)
Facility: HOSPITAL | Age: 89
Discharge: LEFT BEFORE DISCHARGE | End: 2024-01-07
Payer: MEDICARE

## 2024-01-07 VITALS
HEIGHT: 66 IN | DIASTOLIC BLOOD PRESSURE: 69 MMHG | HEART RATE: 75 BPM | WEIGHT: 150 LBS | SYSTOLIC BLOOD PRESSURE: 145 MMHG | OXYGEN SATURATION: 96 % | TEMPERATURE: 99 F | RESPIRATION RATE: 18 BRPM | BODY MASS INDEX: 24.11 KG/M2

## 2024-01-07 RX ORDER — AMOXICILLIN 500 MG/1
500 CAPSULE ORAL 3 TIMES DAILY
COMMUNITY

## 2024-01-07 NOTE — ED INITIAL ASSESSMENT (HPI)
C/o of cough x5 days. States he was given a Rx for amoxicillin, unsure of what his diagnosis was. Unknown fevers. Speaking in full sentences, no distress.

## 2024-01-08 ENCOUNTER — HOSPITAL ENCOUNTER (EMERGENCY)
Facility: HOSPITAL | Age: 89
Discharge: HOME | End: 2024-01-08
Attending: EMERGENCY MEDICINE
Payer: MEDICARE

## 2024-01-08 VITALS
DIASTOLIC BLOOD PRESSURE: 74 MMHG | SYSTOLIC BLOOD PRESSURE: 139 MMHG | TEMPERATURE: 99 F | HEART RATE: 75 BPM | RESPIRATION RATE: 20 BRPM | OXYGEN SATURATION: 96 %

## 2024-01-08 DIAGNOSIS — J06.9 UPPER RESPIRATORY TRACT INFECTION, UNSPECIFIED TYPE: Primary | ICD-10-CM

## 2024-01-08 LAB
FLUAV + FLUBV RNA SPEC NAA+PROBE: NEGATIVE
FLUAV + FLUBV RNA SPEC NAA+PROBE: NEGATIVE
RSV RNA SPEC NAA+PROBE: NEGATIVE
SARS-COV-2 RNA RESP QL NAA+PROBE: NOT DETECTED

## 2024-01-08 PROCEDURE — 99283 EMERGENCY DEPT VISIT LOW MDM: CPT

## 2024-01-08 PROCEDURE — 0241U SARS-COV-2/FLU A AND B/RSV BY PCR (GENEXPERT): CPT | Performed by: EMERGENCY MEDICINE

## 2024-01-08 PROCEDURE — 94640 AIRWAY INHALATION TREATMENT: CPT

## 2024-01-08 RX ORDER — IPRATROPIUM BROMIDE AND ALBUTEROL SULFATE 2.5; .5 MG/3ML; MG/3ML
3 SOLUTION RESPIRATORY (INHALATION) ONCE
Status: COMPLETED | OUTPATIENT
Start: 2024-01-08 | End: 2024-01-08

## 2024-01-08 NOTE — ED PROVIDER NOTES
Patient Seen in: [facility] Emergency Department      History     Chief Complaint   Patient presents with    Nasal Congestion    Difficulty Breathing     Stated Complaint: silvina, nasal congestion    Subjective:   HPI    89-year-old male presenting with nasal congestion.  Patient's been having some nasal congestion last several days he was seen 3 days ago diagnosed with a upper respiratory fracture and placed on amoxicillin but his nasal congestion was keeping him up today prompting his visit here.  He denies any sort of chest pain diaphoresis or any other exacerbating or relieving factors associated symptom.    Objective:   Past Medical History:   Diagnosis Date    Bleeding ulcer     Blood disorder     anemia    Essential hypertension     Exposure to medical diagnostic radiation     radioactive seed implant    Heart murmur     Osteoarthritis     back    Prostate CA (HCC) 2011    Visual impairment     glasses              Past Surgical History:   Procedure Laterality Date    REPAIR ROTATOR CUFF,ACUTE      both shoulders                Social History     Socioeconomic History    Marital status:    Tobacco Use    Smoking status: Never    Smokeless tobacco: Never   Vaping Use    Vaping Use: Never used   Substance and Sexual Activity    Alcohol use: No    Drug use: No              Review of Systems    Positive for stated complaint: silvina, nasal congestion  Other systems are as noted in HPI.  Constitutional and vital signs reviewed.      All other systems reviewed and negative except as noted above.    Physical Exam     ED Triage Vitals [01/08/24 0327]   /74   Pulse 75   Resp 20   Temp 98.8 °F (37.1 °C)   Temp src Temporal   SpO2 96 %   O2 Device None (Room air)       Current:/74   Pulse 75   Temp 98.8 °F (37.1 °C) (Temporal)   Resp 20   SpO2 96%         Physical Exam    Generally the patient is alert and oriented ×3 and appears in no distress  HEENT exam: Tympanic membranes are clear.  Canals  are normal with no auricular preauricular or mastoid tenderness.  Oropharyngeal exam shows no uvula edema or shift.  There is no tongue elevation and palatine tonsils show no purulent material or erythema.  No submandibular erythema and no tenderness along the sternocleidomastoid and no nuchal rigidity  Lungs are clear to auscultation bilaterally with no wheezes retractions or rhonchi noted  Cardiovascular exam shows a regular rate and rhythm  Abdomen soft nontender with no rebound tenderness noted  Extremity exam shows no clubbing cyanosis or edema  Skin exam shows no rashes or lacerations  Neuro exam shows no focal deficits  Back exam shows no tenderness    ED Course     Labs Reviewed   SARS-COV-2/FLU A AND B/RSV BY PCR (GENEXPERT) - Normal    Narrative:     This test is intended for the qualitative detection and differentiation of SARS-CoV-2, influenza A, influenza B, and respiratory syncytial virus (RSV) viral RNA in nasopharyngeal or nares swabs from individuals suspected of respiratory viral infection consistent with COVID-19 by their healthcare provider. Signs and symptoms of respiratory viral infection due to SARS-CoV-2, influenza, and RSV can be similar.    Test performed using the Xpert Xpress SARS-CoV-2/FLU/RSV (real time RT-PCR)  assay on the GeneXpert instrument, [employer], [place] [address].   This test is being used under the Food and Drug Administration's Emergency Use Authorization.    The authorized Fact Sheet for Healthcare Providers for this assay is available upon request from the laboratory.             Differential diagnosis includes RSV, COVID         MDM                                         Medical Decision Making  89-year-old male presenting the emergency department for nasal congestion has been ongoing issue for several days now.  Patient is due to history of amoxicillin for an upper respiratory tract infection the patient was started for the same nasal congestion that has been having.   He denies any other complaints.  COVID RSV test within acceptable limits patient has no signs of respiratory distress no signs of chest pain no diaphoresis no other exacerbating factors I believe he has a viral syndrome he will be discharged home with symptomatic care instructions  The patient was screened and evaluated during this visit.  As a treating physician attending to the patient, I determined, within reasonable clinical confidence and prior to discharge, that an emergency medical condition was not or was no longer present.  There was no indication for further evaluation, treatment or admission on an emergency basis.    The usual and customary discharge instructions were discussed given the patient's ER course.  We discussed signs and symptoms that should prompt the patient's immediate return to the emergency department.  Reasonable over-the-counter and prescription treatment options and physician follow-up plan was discussed.  Patient was discharged home in good condition  This note was prepared using Dragon Medical voice recognition dictation software.  As a result errors may occur.  When identified to these areas have been corrected.  While every attempt is made to correct errors during dictation discrepancies may still exist.  Please contact if there are any errors    Problems Addressed:  Upper respiratory tract infection, unspecified type: acute illness or injury    Amount and/or Complexity of Data Reviewed  Labs: ordered. Decision-making details documented in ED Course.        Disposition and Plan     Clinical Impression:  1. Upper respiratory tract infection, unspecified type         Disposition:  There is no disposition on file for this visit.  There is no disposition time on file for this visit.    Follow-up:  Gini Breaux MD  [address] [place] [id number]  [phone]    Follow up in 1 week(s)            Medications Prescribed:  Current Discharge Medication List

## 2024-02-10 ENCOUNTER — APPOINTMENT (OUTPATIENT)
Dept: GENERAL RADIOLOGY | Facility: HOSPITAL | Age: 89
End: 2024-02-10
Payer: MEDICARE

## 2024-02-10 ENCOUNTER — HOSPITAL ENCOUNTER (EMERGENCY)
Facility: HOSPITAL | Age: 89
Discharge: HOME | End: 2024-02-11
Attending: EMERGENCY MEDICINE
Payer: MEDICARE

## 2024-02-10 DIAGNOSIS — K59.00 CONSTIPATION, UNSPECIFIED CONSTIPATION TYPE: Primary | ICD-10-CM

## 2024-02-10 LAB
BILIRUB UR QL STRIP.AUTO: NEGATIVE
CLARITY UR REFRACT.AUTO: CLEAR
GLUCOSE UR STRIP.AUTO-MCNC: NORMAL MG/DL
KETONES UR STRIP.AUTO-MCNC: NEGATIVE MG/DL
LEUKOCYTE ESTERASE UR QL STRIP.AUTO: NEGATIVE
NITRITE UR QL STRIP.AUTO: NEGATIVE
PH UR STRIP.AUTO: 5.5 [PH] (ref 5–8)
PROT UR STRIP.AUTO-MCNC: NEGATIVE MG/DL
SP GR UR STRIP.AUTO: 1.02 (ref 1–1.03)
UROBILINOGEN UR STRIP.AUTO-MCNC: NORMAL MG/DL

## 2024-02-10 PROCEDURE — 99283 EMERGENCY DEPT VISIT LOW MDM: CPT

## 2024-02-10 PROCEDURE — 81001 URINALYSIS AUTO W/SCOPE: CPT

## 2024-02-10 PROCEDURE — 81001 URINALYSIS AUTO W/SCOPE: CPT | Performed by: EMERGENCY MEDICINE

## 2024-02-10 PROCEDURE — 36415 COLL VENOUS BLD VENIPUNCTURE: CPT

## 2024-02-10 PROCEDURE — 74018 RADEX ABDOMEN 1 VIEW: CPT

## 2024-02-10 PROCEDURE — 99284 EMERGENCY DEPT VISIT MOD MDM: CPT

## 2024-02-11 VITALS
WEIGHT: 149.94 LBS | BODY MASS INDEX: 24 KG/M2 | DIASTOLIC BLOOD PRESSURE: 73 MMHG | OXYGEN SATURATION: 96 % | SYSTOLIC BLOOD PRESSURE: 147 MMHG | RESPIRATION RATE: 21 BRPM | TEMPERATURE: 98 F | HEART RATE: 56 BPM

## 2024-02-11 LAB
ALBUMIN SERPL-MCNC: 4 G/DL (ref 3.4–5)
ALBUMIN/GLOB SERPL: 1.2 {RATIO} (ref 1–2)
ALP LIVER SERPL-CCNC: 67 U/L
ALT SERPL-CCNC: 38 U/L
ANION GAP SERPL CALC-SCNC: 2 MMOL/L (ref 0–18)
AST SERPL-CCNC: 30 U/L (ref 15–37)
BASOPHILS # BLD AUTO: 0.04 X10(3) UL (ref 0–0.2)
BASOPHILS NFR BLD AUTO: 0.6 %
BILIRUB SERPL-MCNC: 0.4 MG/DL (ref 0.1–2)
BUN BLD-MCNC: 21 MG/DL (ref 9–23)
CALCIUM BLD-MCNC: 9.8 MG/DL (ref 8.5–10.1)
CHLORIDE SERPL-SCNC: 112 MMOL/L (ref 98–112)
CO2 SERPL-SCNC: 28 MMOL/L (ref 21–32)
CREAT BLD-MCNC: 1.23 MG/DL
EGFRCR SERPLBLD CKD-EPI 2021: 55 ML/MIN/1.73M2 (ref 60–?)
EOSINOPHIL # BLD AUTO: 0.09 X10(3) UL (ref 0–0.7)
EOSINOPHIL NFR BLD AUTO: 1.4 %
ERYTHROCYTE [DISTWIDTH] IN BLOOD BY AUTOMATED COUNT: 15 %
GLOBULIN PLAS-MCNC: 3.4 G/DL (ref 2.8–4.4)
GLUCOSE BLD-MCNC: 85 MG/DL (ref 70–99)
HCT VFR BLD AUTO: 35 %
HGB BLD-MCNC: 11.2 G/DL
IMM GRANULOCYTES # BLD AUTO: 0.02 X10(3) UL (ref 0–1)
IMM GRANULOCYTES NFR BLD: 0.3 %
LYMPHOCYTES # BLD AUTO: 2.09 X10(3) UL (ref 1–4)
LYMPHOCYTES NFR BLD AUTO: 33 %
MCH RBC QN AUTO: 23.1 PG (ref 26–34)
MCHC RBC AUTO-ENTMCNC: 32 G/DL (ref 31–37)
MCV RBC AUTO: 72.2 FL
MONOCYTES # BLD AUTO: 0.86 X10(3) UL (ref 0.1–1)
MONOCYTES NFR BLD AUTO: 13.6 %
NEUTROPHILS # BLD AUTO: 3.24 X10 (3) UL (ref 1.5–7.7)
NEUTROPHILS # BLD AUTO: 3.24 X10(3) UL (ref 1.5–7.7)
NEUTROPHILS NFR BLD AUTO: 51.1 %
OSMOLALITY SERPL CALC.SUM OF ELEC: 296 MOSM/KG (ref 275–295)
PLATELET # BLD AUTO: 184 10(3)UL (ref 150–450)
POTASSIUM SERPL-SCNC: 4.2 MMOL/L (ref 3.5–5.1)
PROT SERPL-MCNC: 7.4 G/DL (ref 6.4–8.2)
RBC # BLD AUTO: 4.85 X10(6)UL
SODIUM SERPL-SCNC: 142 MMOL/L (ref 136–145)
WBC # BLD AUTO: 6.3 X10(3) UL (ref 4–11)

## 2024-02-11 PROCEDURE — 85025 COMPLETE CBC W/AUTO DIFF WBC: CPT | Performed by: EMERGENCY MEDICINE

## 2024-02-11 PROCEDURE — 80053 COMPREHEN METABOLIC PANEL: CPT | Performed by: EMERGENCY MEDICINE

## 2024-02-11 RX ORDER — POLYETHYLENE GLYCOL 3350 17 G/17G
17 POWDER, FOR SOLUTION ORAL DAILY PRN
Qty: 12 EACH | Refills: 0 | Status: SHIPPED | OUTPATIENT
Start: 2024-02-11 | End: 2024-03-12

## 2024-02-11 NOTE — ED PROVIDER NOTES
Patient Seen in: [facility] Emergency Department      History     Chief Complaint   Patient presents with    Constipation     Stated Complaint: constipation    Subjective:   HPI    Patient is a 89-year-old male who presents but has been having some constipation for a while.  States he took some Colace and a little prune juice with very minimal relief.  States he had a small bowel movement yesterday.  Denies any abdominal pain no other complaints.  States his stool has been small little round balls.  Denies any blood in stool.    Objective:   Past Medical History:   Diagnosis Date    Bleeding ulcer     Blood disorder     anemia    Essential hypertension     Exposure to medical diagnostic radiation     radioactive seed implant    Heart murmur     Osteoarthritis     back    Prostate CA (HCC) 2011    Visual impairment     glasses              Past Surgical History:   Procedure Laterality Date    REPAIR ROTATOR CUFF,ACUTE      both shoulders                Social History     Socioeconomic History    Marital status:    Tobacco Use    Smoking status: Never    Smokeless tobacco: Never   Vaping Use    Vaping Use: Never used   Substance and Sexual Activity    Alcohol use: No    Drug use: No              Review of Systems    Positive for stated complaint: constipation  Other systems are as noted in HPI.  Constitutional and vital signs reviewed.      All other systems reviewed and negative except as noted above.    Physical Exam     ED Triage Vitals [02/10/24 2305]   /80   Pulse 71   Resp 16   Temp 97.8 °F (36.6 °C)   Temp src Temporal   SpO2 96 %   O2 Device None (Room air)       Current:BP (!) 168/99   Pulse 73   Temp 97.8 °F (36.6 °C) (Temporal)   Resp 17   Wt 68 kg   SpO2 99%   BMI 24.20 kg/m²         Physical Exam      Vital signs reviewed  General appearance: Patient is alert and in no acute distress  HEENT: Pupils equal react to light extraocular muscles intact no scleral icterus, mucous  membranes are moist, there is no erythema or exudate in the posterior pharynx  Neck: Supple no JVD no lymphadenopathy no meningismus no carotid bruit  CV: Regular rate and rhythm no murmur rub  Respiratory: Clear to auscultation bilaterally no crackles no wheezes no accessory muscle use  Abdomen: Soft nontender nondistended, there is stool palpated throughout abdomen no rebound no guarding  no hepatosplenomegaly bowel sounds are present , no pulsatile mass  Extremities: No clubbing cyanosis or edema 2+ distal pulses.  Neuro: Cranial nerves II through XII intact with no gross focal sensory or motor abnormality.      ED Course     Labs Reviewed   URINALYSIS, ROUTINE - Abnormal; Notable for the following components:       Result Value    Blood Urine Trace (*)     RBC Urine 3-5 (*)     All other components within normal limits   COMP METABOLIC PANEL (14) - Abnormal; Notable for the following components:    Calculated Osmolality 296 (*)     eGFR-Cr 55 (*)     All other components within normal limits   CBC W/ DIFFERENTIAL - Abnormal; Notable for the following components:    HGB 11.2 (*)     HCT 35.0 (*)     MCV 72.2 (*)     MCH 23.1 (*)     All other components within normal limits   CBC WITH DIFFERENTIAL WITH PLATELET    Narrative:     The following orders were created for panel order CBC With Differential With Platelet.  Procedure                               Abnormality         Status                     ---------                               -----------         ------                     CBC W/ DIFFERENTIAL[[id number]]          Abnormal            Final result                 Please view results for these tests on the individual orders.             Patient is a very pleasant male and states he has not been taking much Colace just some prune juice.  I told him I would like to get an x-ray and do some baseline lab work just to make sure everything looks okay.  He agrees with plan.  States he really is in no distress  just was trying to help get something to help him have a bowel movement    XR ABDOMEN (1 VIEW) (CPT=74018)    Result Date: 2/10/2024  CONCLUSION:   No radiographic evidence of ileus or bowel obstruction.  Moderate stool volume noted diffusely throughout the colon.  No regional mass effect or appreciable urolithiasis.  Few pelvic phleboliths noted.  Brachytherapy seeds of the prostate bed.  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   LOCATION:  [facility]   Dictated by (CST): Joanna Mathias MD on 2/10/2024 at 11:42 PM     Finalized by (CST): Joanna Mathias MD on 2/10/2024 at 11:43 PM           Does have moderate amount of stool but no signs of any obstruction.  Patient will be discharged with some MiraLAX as I feel that will help him.  Also increase his fiber.  Follow-up with his primary and drink plenty of fluids.  Return if any worsening problem he agrees with plan I do not feel a CT is warranted as he has no pain    MDM        For the patient presenting with constipation, here's a detailed Medical Decision Making (MDM) section:    Medical Decision Making (MDM)    Clinical Impression:Constipation, unspecified type    Differential Diagnosis:Functional Constipation: The patient's history of prolonged constipation with infrequent bowel movements and the description of stool as small round balls suggest a functional cause. The absence of alarming symptoms like significant weight loss, rectal bleeding, or severe abdominal pain lowers the suspicion for an organic pathology.  Drug-Induced Constipation: Considering the patient's age and likely polypharmacy, this remains a potential contributing factor. However, no specific medications were noted that are commonly associated with constipation.  Obstructive Constipation: Given the patient's advanced age, the possibility of a colorectal mass causing obstructive symptoms was considered. However, the absence of symptoms such as alternating bowel habits, significant  abdominal pain, or rectal bleeding makes this less likely.  Hypothyroidism: Can cause constipation due to slowed gastrointestinal motility. No history suggestive of hypothyroidism symptoms was provided, making this less likely without further thyroid function testing.    Comorbidities Impacting Management:Age-Related Physiological Changes: Reduced bowel motility and potential dehydration can exacerbate constipation in elderly patients.  Anemia and Chronic Illnesses: These conditions can contribute to the patient's overall weakness and fatigue, complicating the management of constipation.      Discussions of Management:Discussed the patient's management plan, focusing on non-pharmacological interventions such as dietary fiber increase and hydration, as well as pharmacological treatment with MiraLAX.    Independent Interpretation of Studies:X-Ray Abdomen: Reviewed and interpreted the abdominal X-ray which showed moderate stool throughout the colon but no evidence of obstruction or ileus, guiding the decision against aggressive interventions like enemas or manual disimpaction.      Social Determinants of Health Affecting Care:The patient's living situation, ability to purchase medications, and access to follow-up care were considered in the management plan, ensuring recommendations were feasible.    Shared Decision Making:Engaged in shared decision-making with the patient regarding the treatment plan, emphasizing the importance of hydration, dietary modifications, and the use of over-the-counter laxatives. The decision to avoid hospital admission was made collaboratively, with instructions for follow-up and circumstances under which to seek further medical attention.    Disposition:Discharged home with instructions for laxative use, dietary recommendations, and follow-up with primary care for ongoing constipation management and to review the need for further evaluations such as colonoscopy or thyroid function tests if  symptoms persist or worsen.    Follow-Up:  Advised to follow up with primary care physician, Dr. Gini Breaux, for reevaluation and possible adjustment of the management plan based on symptom progression or resolution.      Patient was screened and evaluated during this visit.  As the treating physician attending to the patient, I determined within reasonable clinical confidence and prior to discharge, that an emergency medical condition was not or was no longer present.  There was no indication for further evaluation, treatment, or admission on an emergency basis.  Comprehensive verbal and written discharge and follow-up instructions were provided to help prevent relapse or worsening.  Patient was instructed to follow-up with primary care provider for further evaluation treatment, return immediately to ER for worsening, concerning, new, or changing/persisting symptoms.  I discussed the case with the patient and they had no questions, complaints, or concerns.  Patient was comfortable going home.      Dictation Disclaimer Note:   To increase efficiency this document may have been prepared using voice recognition technology. Every effort has been made to correct any errors made during preparation of this note. However, if a word or phrase is confusing, or does not make sense, this is likely due to a recognition error within the program which was not discovered during editing. Please do not hesitate to contact to address any significant errors.    Note to Patient:   The 21st Century Cures Act makes medical notes like these available to patients in the interest of transparency. Please be advised this is a medical document. Medical documents are intended to carry relevant information, facts as evident, and the clinical opinion of the practitioner. The medical note is intended as peer to peer communication and may appear blunt or direct. It is written in medical language and may contain abbreviations or verbiage  that are unfamiliar.                                    MDM    Disposition and Plan     Clinical Impression:  1. Constipation, unspecified constipation type         Disposition:  Discharge  2/11/2024  1:37 am    Follow-up:  Gini Breaux MD  [address] [place] [id number] [phone]    Follow up            Medications Prescribed:  Current Discharge Medication List        START taking these medications    Details   polyethylene glycol, PEG 3350, 17 g Oral Powd Pack Take 17 g by mouth daily as needed.  Qty: 12 each, Refills: 0

## 2024-02-11 NOTE — ED INITIAL ASSESSMENT (HPI)
Pt arrives with c/o constipation \"for a while\" pt states he take Colace and prune juice with minimal relief. Last small BM yesterday.

## 2024-04-30 ENCOUNTER — HOSPITAL ENCOUNTER (EMERGENCY)
Facility: HOSPITAL | Age: 89
Discharge: HOME | End: 2024-04-30
Attending: EMERGENCY MEDICINE
Payer: MEDICARE

## 2024-04-30 ENCOUNTER — APPOINTMENT (OUTPATIENT)
Dept: GENERAL RADIOLOGY | Facility: HOSPITAL | Age: 89
End: 2024-04-30
Attending: EMERGENCY MEDICINE
Payer: MEDICARE

## 2024-04-30 VITALS
HEIGHT: 67 IN | OXYGEN SATURATION: 98 % | HEART RATE: 59 BPM | RESPIRATION RATE: 18 BRPM | BODY MASS INDEX: 23.54 KG/M2 | WEIGHT: 150 LBS | DIASTOLIC BLOOD PRESSURE: 80 MMHG | TEMPERATURE: 99 F | SYSTOLIC BLOOD PRESSURE: 167 MMHG

## 2024-04-30 DIAGNOSIS — K59.00 CONSTIPATION, UNSPECIFIED CONSTIPATION TYPE: Primary | ICD-10-CM

## 2024-04-30 PROCEDURE — 99283 EMERGENCY DEPT VISIT LOW MDM: CPT

## 2024-04-30 PROCEDURE — 74021 RADEX ABDOMEN 3+ VIEWS: CPT | Performed by: EMERGENCY MEDICINE

## 2024-04-30 PROCEDURE — 99284 EMERGENCY DEPT VISIT MOD MDM: CPT

## 2024-04-30 NOTE — ED INITIAL ASSESSMENT (HPI)
Pt arrives to ED with c/o constipation. Pt denies N/V or abdominal pain. Last bowel movement was 2 days ago but pt states it was marble sized and hard.

## 2024-05-01 NOTE — ED PROVIDER NOTES
Patient Seen in: [facility] Emergency Department      History     Chief Complaint   Patient presents with    Constipation     Stated Complaint: \"trouble with bowels\"    Subjective:   HPI    Patient is a 89-year-old male presents to emergency room for evaluation of constipation.  Patient states he cannot remember when he had a normal bowel movement last.  He did have a small pebble sized bowel movement a couple days ago.  Denies abdominal pain or vomiting.  No history of bowel obstruction.    Objective:   Past Medical History:    Bleeding ulcer    Blood disorder    anemia    Essential hypertension    Exposure to medical diagnostic radiation    radioactive seed implant    Heart murmur    Osteoarthritis    back    Prostate CA (HCC)    Visual impairment    glasses              Past Surgical History:   Procedure Laterality Date    Repair rotator cuff,acute      both shoulders                Social History     Socioeconomic History    Marital status:    Tobacco Use    Smoking status: Never    Smokeless tobacco: Never   Vaping Use    Vaping status: Never Used   Substance and Sexual Activity    Alcohol use: No    Drug use: No     Social Determinants of Health      Received from [facility], [facility]    Social Connections    Received from [facility], [facility]    Housing Stability              Review of Systems    Positive for stated complaint: \"trouble with bowels\"  Other systems are as noted in HPI.  Constitutional and vital signs reviewed.      All other systems reviewed and negative except as noted above.    Physical Exam     ED Triage Vitals [04/30/24 0330]   BP (!) 167/80   Pulse 60   Resp 16   Temp 98.6 °F (37 °C)   Temp src Oral   SpO2 99 %   O2 Device None (Room air)       Current:BP (!) 167/80   Pulse 59   Temp 98.6 °F (37 °C) (Oral)   Resp 18   Ht 170.2 cm (5' 7\")   Wt 68 kg   SpO2 98%   BMI 23.49 kg/m²          Physical Exam    CONSTITUTIONAL: Well appearing, in no acute distress  LUNGS: Clear to auscultation bilaterally  CARDIOVASCULAR: Regular rate and rhythm, normal S1-S2  ABDOMINAL: Soft, nontender, nondistended  SKIN: Warm and dry  Rectal: Small amount of stool in the vault    ED Course   Labs Reviewed - No data to display          I personally reviewed xray films of abdomen and pelvis and independent interpretation shows moderate amount of stool in the colon.  I also reviewed formal xray report as read by radiology with findings below:  XR ABDOMEN, OBSTRUCTIVE SERIES 3 VIEWS(CPT=74021)    Result Date: 4/30/2024  PROCEDURE:  XR ABDOMEN, OBSTRUCTIVE SERIES 3 VIEWS(CPT=74021)  TECHNIQUE:  3 view obstructive series of the abdomen and pelvis were obtained.  COMPARISON:  [facility] , XR, XR ABDOMEN (1 VIEW) (CPT=74018), 2/10/2024, 11:20 PM.  INDICATIONS:  trouble with bowels, not otherwise specified  PATIENT STATED HISTORY: (As transcribed by Technologist)  trouble with bowels , not otherwise specified              CONCLUSION:  Preliminary reading provided by radiologist from [facility].  Moderate amount of stool throughout the colon.  No excessive gas in the GI tract.  No features to indicate small bowel obstruction.  No signs of free air.  Degenerative changes in the spine and hips.  Radiation therapy implants in the prostate gland.  LOCATION:  [facility]    Dictated by (CST): Serafin Lynn MD on 4/30/2024 at 8:23 AM     Finalized by (CST): Serafin Lynn MD on 4/30/2024 at 8:24 AM               MDM      Patient is a 89-year-old male presents to emergency room for evaluation of constipation.  Differential clues fecal impaction, constipation.  X-ray obtained showing moderate amount of stool in the colon without evidence for bowel obstruction.  Patient given soapsuds enema with resultant large bowel movement.  Patient be discharged.  Recommend high-fiber diet.  Recommend MiraLAX or Metamucil daily.    Patient was  screened and evaluated during this visit.   As a treating physician attending to the patient, I determined, within reasonable clinical confidence and prior to discharge, that an emergency medical condition was not or was no longer present.  There was no indication for further evaluation, treatment or admission on an emergency basis.  Comprehensive verbal and written discharge and follow-up instructions were provided to help prevent relapse or worsening.  Patient was instructed to follow-up with her primary care provider for further evaluation and treatment, but to return immediately to the ER for worsening, concerning, new, changing or persisting symptoms.  I discussed the case with the patient and they had no questions, complaints, or concerns.  Patient felt comfortable going home.               MDM    Disposition and Plan     Clinical Impression:  1. Constipation, unspecified constipation type         Disposition:  Discharge  4/30/2024  5:53 am    Follow-up:  Gini Breaux MD  [address] [place] [id number]  [phone]    Follow up  As needed          Medications Prescribed:  Discharge Medication List as of 4/30/2024  6:02 AM

## 2024-05-28 ENCOUNTER — APPOINTMENT (OUTPATIENT)
Dept: CT IMAGING | Facility: HOSPITAL | Age: 89
End: 2024-05-28
Attending: EMERGENCY MEDICINE

## 2024-05-28 ENCOUNTER — HOSPITAL ENCOUNTER (EMERGENCY)
Facility: HOSPITAL | Age: 89
Discharge: HOME | End: 2024-05-28
Attending: EMERGENCY MEDICINE

## 2024-05-28 VITALS
OXYGEN SATURATION: 99 % | RESPIRATION RATE: 17 BRPM | WEIGHT: 150 LBS | SYSTOLIC BLOOD PRESSURE: 168 MMHG | TEMPERATURE: 98 F | BODY MASS INDEX: 23 KG/M2 | DIASTOLIC BLOOD PRESSURE: 72 MMHG | HEART RATE: 54 BPM

## 2024-05-28 DIAGNOSIS — K59.00 CONSTIPATION, UNSPECIFIED CONSTIPATION TYPE: Primary | ICD-10-CM

## 2024-05-28 LAB
ALBUMIN SERPL-MCNC: 3.9 G/DL (ref 3.4–5)
ALBUMIN/GLOB SERPL: 1.2 {RATIO} (ref 1–2)
ALP LIVER SERPL-CCNC: 64 U/L
ALT SERPL-CCNC: 35 U/L
ANION GAP SERPL CALC-SCNC: 4 MMOL/L (ref 0–18)
AST SERPL-CCNC: 19 U/L (ref 15–37)
BASOPHILS # BLD AUTO: 0.05 X10(3) UL (ref 0–0.2)
BASOPHILS NFR BLD AUTO: 0.7 %
BILIRUB SERPL-MCNC: 0.4 MG/DL (ref 0.1–2)
BILIRUB UR QL STRIP.AUTO: NEGATIVE
BUN BLD-MCNC: 22 MG/DL (ref 9–23)
CALCIUM BLD-MCNC: 9.6 MG/DL (ref 8.5–10.1)
CHLORIDE SERPL-SCNC: 112 MMOL/L (ref 98–112)
CLARITY UR REFRACT.AUTO: CLEAR
CO2 SERPL-SCNC: 25 MMOL/L (ref 21–32)
COLOR UR AUTO: COLORLESS
CREAT BLD-MCNC: 1.01 MG/DL
EGFRCR SERPLBLD CKD-EPI 2021: 70 ML/MIN/1.73M2 (ref 60–?)
EOSINOPHIL # BLD AUTO: 0.1 X10(3) UL (ref 0–0.7)
EOSINOPHIL NFR BLD AUTO: 1.4 %
ERYTHROCYTE [DISTWIDTH] IN BLOOD BY AUTOMATED COUNT: 15.1 %
GLOBULIN PLAS-MCNC: 3.3 G/DL (ref 2.8–4.4)
GLUCOSE BLD-MCNC: 102 MG/DL (ref 70–99)
GLUCOSE UR STRIP.AUTO-MCNC: NORMAL MG/DL
HCT VFR BLD AUTO: 36.5 %
HGB BLD-MCNC: 11.3 G/DL
IMM GRANULOCYTES # BLD AUTO: 0.02 X10(3) UL (ref 0–1)
IMM GRANULOCYTES NFR BLD: 0.3 %
KETONES UR STRIP.AUTO-MCNC: NEGATIVE MG/DL
LEUKOCYTE ESTERASE UR QL STRIP.AUTO: NEGATIVE
LYMPHOCYTES # BLD AUTO: 2.01 X10(3) UL (ref 1–4)
LYMPHOCYTES NFR BLD AUTO: 28.6 %
MCH RBC QN AUTO: 23 PG (ref 26–34)
MCHC RBC AUTO-ENTMCNC: 31 G/DL (ref 31–37)
MCV RBC AUTO: 74.2 FL
MONOCYTES # BLD AUTO: 0.86 X10(3) UL (ref 0.1–1)
MONOCYTES NFR BLD AUTO: 12.2 %
NEUTROPHILS # BLD AUTO: 3.99 X10 (3) UL (ref 1.5–7.7)
NEUTROPHILS # BLD AUTO: 3.99 X10(3) UL (ref 1.5–7.7)
NEUTROPHILS NFR BLD AUTO: 56.8 %
NITRITE UR QL STRIP.AUTO: NEGATIVE
OSMOLALITY SERPL CALC.SUM OF ELEC: 296 MOSM/KG (ref 275–295)
PH UR STRIP.AUTO: 6 [PH] (ref 5–8)
PLATELET # BLD AUTO: 201 10(3)UL (ref 150–450)
POTASSIUM SERPL-SCNC: 4.1 MMOL/L (ref 3.5–5.1)
PROT SERPL-MCNC: 7.2 G/DL (ref 6.4–8.2)
PROT UR STRIP.AUTO-MCNC: NEGATIVE MG/DL
RBC # BLD AUTO: 4.92 X10(6)UL
SODIUM SERPL-SCNC: 141 MMOL/L (ref 136–145)
SP GR UR STRIP.AUTO: <1.005 (ref 1–1.03)
UROBILINOGEN UR STRIP.AUTO-MCNC: NORMAL MG/DL
WBC # BLD AUTO: 7 X10(3) UL (ref 4–11)

## 2024-05-28 PROCEDURE — 74177 CT ABD & PELVIS W/CONTRAST: CPT | Performed by: EMERGENCY MEDICINE

## 2024-05-28 PROCEDURE — 85025 COMPLETE CBC W/AUTO DIFF WBC: CPT | Performed by: EMERGENCY MEDICINE

## 2024-05-28 PROCEDURE — 81001 URINALYSIS AUTO W/SCOPE: CPT | Performed by: EMERGENCY MEDICINE

## 2024-05-28 PROCEDURE — 99284 EMERGENCY DEPT VISIT MOD MDM: CPT

## 2024-05-28 PROCEDURE — 80053 COMPREHEN METABOLIC PANEL: CPT | Performed by: EMERGENCY MEDICINE

## 2024-05-28 PROCEDURE — 96360 HYDRATION IV INFUSION INIT: CPT

## 2024-05-28 PROCEDURE — 99285 EMERGENCY DEPT VISIT HI MDM: CPT

## 2024-05-28 RX ORDER — SODIUM CHLORIDE 9 MG/ML
125 INJECTION, SOLUTION INTRAVENOUS CONTINUOUS
Status: DISCONTINUED | OUTPATIENT
Start: 2024-05-28 | End: 2024-05-28

## 2024-05-28 NOTE — ED INITIAL ASSESSMENT (HPI)
Pt ambulated into the Ed with c/o \"I am having trouble moving my bowels.\" Pt states last normal b/m was \"last time I was here.\"

## 2024-05-28 NOTE — ED PROVIDER NOTES
Patient Seen in: [facility] Emergency Department      History     Chief Complaint   Patient presents with    Constipation     Stated Complaint: Constipation for a few days    Subjective:   HPI    89-year-old male presenting with constipation he has had for over 4 years now but has been constant since his last hospital patient discharged.  He denies abdominal pain chest pain shortness of breath nausea vomiting or any other complaints    Objective:   Past Medical History:    Bleeding ulcer    Blood disorder    anemia    Essential hypertension    Exposure to medical diagnostic radiation    radioactive seed implant    Heart murmur    Osteoarthritis    back    Prostate CA (HCC)    Visual impairment    glasses              Past Surgical History:   Procedure Laterality Date    Repair rotator cuff,acute      both shoulders                Social History     Socioeconomic History    Marital status:    Tobacco Use    Smoking status: Never    Smokeless tobacco: Never   Vaping Use    Vaping status: Never Used   Substance and Sexual Activity    Alcohol use: No    Drug use: No     Social Determinants of Health      Received from [facility], [facility]    Social Connections    Received from [facility], [facility]    Housing Stability              Review of Systems    Positive for stated complaint: Constipation for a few days  Other systems are as noted in HPI.  Constitutional and vital signs reviewed.      All other systems reviewed and negative except as noted above.    Physical Exam     ED Triage Vitals [05/28/24 0155]   BP (!) 171/75   Pulse 62   Resp 18   Temp 97.8 °F (36.6 °C)   Temp src Temporal   SpO2 98 %   O2 Device None (Room air)       Current Vitals:   Vital Signs  BP: (!) 168/72  Pulse: 54  Resp: 17  Temp: 97.8 °F (36.6 °C)  Temp src: Temporal  MAP (mmHg): 100    Oxygen Therapy  SpO2: 99 %  O2 Device: None (Room  air)            Physical Exam    Awake alert patient appears no distress HEENT exam is normal lungs are clear cardiovascular exam shows regular rhythm abdomen soft nontender extremities no COVID cyanosis or edema no rash back exam is normal skin is nondiaphoretic    ED Course     Labs Reviewed   COMP METABOLIC PANEL (14) - Abnormal; Notable for the following components:       Result Value    Glucose 102 (*)     Calculated Osmolality 296 (*)     All other components within normal limits   URINALYSIS WITH CULTURE REFLEX - Abnormal; Notable for the following components:    Urine Color Colorless (*)     Spec Gravity <1.005 (*)     Blood Urine Trace (*)     All other components within normal limits   CBC W/ DIFFERENTIAL - Abnormal; Notable for the following components:    HGB 11.3 (*)     HCT 36.5 (*)     MCV 74.2 (*)     MCH 23.0 (*)     All other components within normal limits   CBC WITH DIFFERENTIAL WITH PLATELET    Narrative:     The following orders were created for panel order CBC With Differential With Platelet.  Procedure                               Abnormality         Status                     ---------                               -----------         ------                     CBC W/ DIFFERENTIAL[[id number]]          Abnormal            Final result                 Please view results for these tests on the individual orders.             Differential diagnosis includes obstruction, perforation         MDM                                         Medical Decision Making  89-year-old male presenting with constipation.  IV established cardiac monitor shows a sinus rhythm pulse ox shows no signs of hypoxia CBC shows a stable white count metabolic panel stable renal function.  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  The patient's constipation I have recommended milk of magnesia for patient is to return to the emergency department for worsening symptoms other complaints  The patient was screened and evaluated during this visit.  As a treating physician attending to the patient, I determined, within reasonable clinical confidence and prior to discharge, that an emergency medical condition was not or was no longer present.  There was no indication for further evaluation, treatment or admission on an emergency basis.    The usual and customary discharge instructions were discussed given the patient's ER course.  We discussed signs and symptoms that should prompt the patient's immediate return to the emergency department.  Reasonable over-the-counter and prescription treatment options and physician follow-up plan was discussed.  Patient was discharged home in good condition  This note was prepared using Dragon Medical voice recognition dictation software.  As a result errors may occur.  When identified to these areas have been corrected.  While every attempt is made to correct errors during dictation discrepancies may still exist.  Please contact if there are any errors    Problems Addressed:  Constipation, unspecified constipation type: acute illness or injury    Amount and/or Complexity of Data Reviewed  Labs: ordered. Decision-making details documented in ED Course.  Radiology: ordered and independent interpretation performed. Decision-making details documented in ED Course.  ECG/medicine tests: ordered and independent interpretation performed. Decision-making details documented in ED Course.        Disposition and Plan     Clinical Impression:  1. Constipation, unspecified constipation type          Disposition:  Discharge  5/28/2024  4:11 am    Follow-up:  Gini Breaux MD  [address] [place] [id number] [phone]    Follow up in 1 week(s)            Medications Prescribed:  Current Discharge Medication List

## 2024-06-07 ENCOUNTER — HOSPITAL ENCOUNTER (EMERGENCY)
Facility: HOSPITAL | Age: 89
Discharge: HOME | End: 2024-06-08
Attending: EMERGENCY MEDICINE
Payer: OTHER GOVERNMENT

## 2024-06-07 DIAGNOSIS — K59.00 CONSTIPATION, UNSPECIFIED CONSTIPATION TYPE: Primary | ICD-10-CM

## 2024-06-07 PROCEDURE — 99283 EMERGENCY DEPT VISIT LOW MDM: CPT

## 2024-06-07 PROCEDURE — 99282 EMERGENCY DEPT VISIT SF MDM: CPT

## 2024-06-08 VITALS
DIASTOLIC BLOOD PRESSURE: 83 MMHG | SYSTOLIC BLOOD PRESSURE: 140 MMHG | HEART RATE: 75 BPM | WEIGHT: 150 LBS | RESPIRATION RATE: 19 BRPM | OXYGEN SATURATION: 99 % | BODY MASS INDEX: 23 KG/M2 | TEMPERATURE: 99 F

## 2024-06-08 NOTE — ED PROVIDER NOTES
Patient Seen in: [facility] Emergency Department      History     Chief Complaint   Patient presents with    Constipation     Stated Complaint: constipation    Subjective:   HPI    21-year-old male presenting emergency department for constipation.  Patient states he has chronic constipation even though he is taking some polyethylene glycol patient is continuing with constipation he was seen by the same ED physician a week ago failed to take any milk of magnesia since that visit.  He denies any other complaints.    Objective:   Past Medical History:    Bleeding ulcer    Blood disorder    anemia    Essential hypertension    Exposure to medical diagnostic radiation    radioactive seed implant    Heart murmur    Osteoarthritis    back    Prostate CA (HCC)    Visual impairment    glasses              Past Surgical History:   Procedure Laterality Date    Repair rotator cuff,acute      both shoulders                Social History     Socioeconomic History    Marital status:    Tobacco Use    Smoking status: Never    Smokeless tobacco: Never   Vaping Use    Vaping status: Never Used   Substance and Sexual Activity    Alcohol use: No    Drug use: No     Social Determinants of Health      Received from [facility], [facility]    Social Connections    Received from [facility], [facility]    Housing Stability              Review of Systems    Positive for stated complaint: constipation  Other systems are as noted in HPI.  Constitutional and vital signs reviewed.      All other systems reviewed and negative except as noted above.    Physical Exam     ED Triage Vitals [06/07/24 2327]   BP (!) 176/83   Pulse 74   Resp 18   Temp 98.5 °F (36.9 °C)   Temp src Temporal   SpO2 100 %   O2 Device None (Room air)       Current Vitals:   Vital Signs  BP: 140/83  Pulse: 75  Resp: 19  Temp: 98.5 °F (36.9 °C)  Temp src: Temporal    Oxygen  Therapy  SpO2: 99 %  O2 Device: None (Room air)            Physical Exam  Awake alert smiling interactive patient appears no distress HEENT exam is normal lungs are clear cardiovascular exam shows regular rhythm abdomen soft nontender extremities no Cyanosis or edema no rash       ED Course   Labs Reviewed - No data to display          Differential diagnosis includes obstruction, constipation         MDM                                           Medical Decision Making  89-year-old male return to the emergency department for constipation.  Patient was given milk of magnesia as well as an enema here in the emergency department has a large stool output patient feels better will be discharged home patient was given symptomatic care instructions  The patient was screened and evaluated during this visit.  As a treating physician attending to the patient, I determined, within reasonable clinical confidence and prior to discharge, that an emergency medical condition was not or was no longer present.  There was no indication for further evaluation, treatment or admission on an emergency basis.    The usual and customary discharge instructions were discussed given the patient's ER course.  We discussed signs and symptoms that should prompt the patient's immediate return to the emergency department.  Reasonable over-the-counter and prescription treatment options and physician follow-up plan was discussed.  Patient was discharged home in good condition  This note was prepared using Dragon Medical voice recognition dictation software.  As a result errors may occur.  When identified to these areas have been corrected.  While every attempt is made to correct errors during dictation discrepancies may still exist.  Please contact if there are any errors    Amount and/or Complexity of Data Reviewed  ECG/medicine tests: ordered and independent interpretation performed. Decision-making details documented in ED  Course.        Disposition and Plan     Clinical Impression:  1. Constipation, unspecified constipation type         Disposition:  Discharge  6/8/2024 12:53 am    Follow-up:  Gini Breaux MD  [address] [place] [id number] [phone]    Follow up in 1 week(s)            Medications Prescribed:  Current Discharge Medication List

## 2024-06-08 NOTE — DISCHARGE INSTRUCTIONS
EKG Report:    ID:0.12  QRS:0.08  QT:0.28  Rhythm: SR-ST, 70s-100s    Ectopy:rare PVC/PAC, one 6 sec run of PSVT at 195     Milk of Magnesia recommended for outpatient management of constipation

## 2024-06-26 ENCOUNTER — HOSPITAL ENCOUNTER (OUTPATIENT)
Dept: CT IMAGING | Age: 89
Discharge: HOME | End: 2024-06-26
Attending: INTERNAL MEDICINE

## 2024-06-26 DIAGNOSIS — K59.00 CONSTIPATION, UNSPECIFIED CONSTIPATION TYPE: ICD-10-CM

## 2024-06-26 PROCEDURE — 74177 CT ABD & PELVIS W/CONTRAST: CPT | Performed by: INTERNAL MEDICINE

## 2024-07-10 ENCOUNTER — HOSPITAL ENCOUNTER (EMERGENCY)
Facility: HOSPITAL | Age: 89
Discharge: HOME | End: 2024-07-10
Attending: EMERGENCY MEDICINE
Payer: MEDICARE

## 2024-07-10 VITALS
HEART RATE: 65 BPM | SYSTOLIC BLOOD PRESSURE: 130 MMHG | OXYGEN SATURATION: 98 % | RESPIRATION RATE: 16 BRPM | DIASTOLIC BLOOD PRESSURE: 66 MMHG | TEMPERATURE: 98 F

## 2024-07-10 DIAGNOSIS — K59.00 CONSTIPATION, UNSPECIFIED CONSTIPATION TYPE: Primary | ICD-10-CM

## 2024-07-10 PROCEDURE — 99283 EMERGENCY DEPT VISIT LOW MDM: CPT

## 2024-07-10 RX ORDER — POLYETHYLENE GLYCOL 3350 17 G/17G
17 POWDER, FOR SOLUTION ORAL DAILY PRN
Qty: 12 EACH | Refills: 0 | Status: SHIPPED | OUTPATIENT
Start: 2024-07-10 | End: 2024-08-09

## 2024-07-10 NOTE — ED PROVIDER NOTES
Patient Seen in: [facility] Emergency Department      History     Chief Complaint   Patient presents with    Constipation     Stated Complaint: constipation    Subjective:   HPI    Patient is a very pleasant elderly 89-year-old male who presents with constipation for the last 4 days.  Says he has had this problem in the past.  He is eating and drinking normally.  He denies abdominal pain.  No bowel movement.  No fevers or chills.  No other specific complaints.  Patient otherwise at his medical baseline.    Objective:   Past Medical History:    Bleeding ulcer    Blood disorder    anemia    Essential hypertension    Exposure to medical diagnostic radiation    radioactive seed implant    Heart murmur    Osteoarthritis    back    Prostate CA (HCC)    Visual impairment    glasses              Past Surgical History:   Procedure Laterality Date    Repair rotator cuff,acute      both shoulders                Social History     Socioeconomic History    Marital status:    Tobacco Use    Smoking status: Never    Smokeless tobacco: Never   Vaping Use    Vaping status: Never Used   Substance and Sexual Activity    Alcohol use: No    Drug use: No     Social Determinants of Health      Received from [facility], [facility]    Social Connections    Received from [facility], [facility]    Housing Stability              Review of Systems    Positive for stated Chief Complaint: Constipation    Other systems are as noted in HPI.  Constitutional and vital signs reviewed.      All other systems reviewed and negative except as noted above.    Physical Exam     ED Triage Vitals [07/10/24 1110]   /64   Pulse 68   Resp 16   Temp 97.9 °F (36.6 °C)   Temp src Temporal   SpO2 98 %   O2 Device None (Room air)       Current Vitals:   Vital Signs  BP: 130/66  Pulse: 65  Resp: 16  Temp: 97.9 °F (36.6 °C)  Temp src: Temporal  MAP (mmHg): 85    Oxygen  Therapy  SpO2: 98 %  O2 Device: None (Room air)            Physical Exam    General: Well-appearing patient of stated age resting comfortably  HEENT: Normocephalic atraumatic.  Nonicteric sclera.  Moist mucous membranes  Lungs: No tachypnea  Cardiac: No tachycardia  Abdomen: Nontender.  Soft.  Rectal exam: Nurse chaperone in the room.  No fecal impaction.  Heme-negative  Skin: No rashes, pallor  Neuro: No focal deficits.  Extremities: No cyanosis/ilana    ED Course   Labs Reviewed - No data to display                   MDM      Patient is a 89-year-old gentleman who presents with constipation.  He scattered benign abdominal exam.  He is eating and drinking normally.  Discussed options with patient.  Offered oral medications versus enema.  Patient would prefer enema as this worked for him in the past.  Was given a soapsuds enema with some success here.  Will start MiraLAX at home.  Follow-up with primary care.  High-fiber diet.  Stay well-hydrated.  Regular activity.  Return if worsening symptoms or new complaints.                                   [facility]    Disposition and Plan     Clinical Impression:  1. Constipation, unspecified constipation type         Disposition:  Discharge  7/10/2024  1:06 pm    Follow-up:  Gini Breaux MD  [address] [place] [id number]  [phone]    Follow up in 1 week(s)            Medications Prescribed:  Discharge Medication List as of 7/10/2024  1:16 PM        START taking these medications    Details   polyethylene glycol, PEG 3350, 17 g Oral Powd Pack Take 17 g by mouth daily as needed., Normal, Disp-12 each, R-0

## 2024-07-10 NOTE — ED INITIAL ASSESSMENT (HPI)
Constipation x4 days - states this is an intermittent issue where he is constipated for 4 days, then is able to go for another 4 days, then constipated again for another 4 days.

## 2024-07-10 NOTE — DISCHARGE INSTRUCTIONS
Take MiraLAX.  Can take up to 2-3 times a day if needed.  Return to once a day once a bowel movement.  Return if abdominal pain, vomiting, new complaints.  Follow-up with your doctor.

## 2024-07-23 ENCOUNTER — WALK IN (OUTPATIENT)
Dept: URGENT CARE | Age: 89
End: 2024-07-23

## 2024-07-23 VITALS
OXYGEN SATURATION: 98 % | RESPIRATION RATE: 18 BRPM | TEMPERATURE: 98.1 F | HEART RATE: 56 BPM | SYSTOLIC BLOOD PRESSURE: 124 MMHG | DIASTOLIC BLOOD PRESSURE: 60 MMHG

## 2024-07-23 DIAGNOSIS — J06.9 ACUTE UPPER RESPIRATORY INFECTION, UNSPECIFIED: Primary | ICD-10-CM

## 2024-07-23 DIAGNOSIS — J01.90 ACUTE SINUSITIS, RECURRENCE NOT SPECIFIED, UNSPECIFIED LOCATION: ICD-10-CM

## 2024-07-23 RX ORDER — CEFDINIR 300 MG/1
300 CAPSULE ORAL 2 TIMES DAILY
Qty: 20 CAPSULE | Refills: 0 | Status: SHIPPED | OUTPATIENT
Start: 2024-07-23 | End: 2024-08-02

## 2024-07-27 ENCOUNTER — APPOINTMENT (OUTPATIENT)
Dept: GENERAL RADIOLOGY | Facility: HOSPITAL | Age: 89
End: 2024-07-27
Attending: EMERGENCY MEDICINE
Payer: OTHER GOVERNMENT

## 2024-07-27 ENCOUNTER — HOSPITAL ENCOUNTER (EMERGENCY)
Facility: HOSPITAL | Age: 89
Discharge: HOME | End: 2024-07-27
Attending: EMERGENCY MEDICINE
Payer: OTHER GOVERNMENT

## 2024-07-27 VITALS
TEMPERATURE: 98 F | SYSTOLIC BLOOD PRESSURE: 144 MMHG | DIASTOLIC BLOOD PRESSURE: 65 MMHG | BODY MASS INDEX: 24.11 KG/M2 | WEIGHT: 150 LBS | HEIGHT: 66 IN | HEART RATE: 64 BPM | RESPIRATION RATE: 18 BRPM | OXYGEN SATURATION: 96 %

## 2024-07-27 DIAGNOSIS — J06.9 UPPER RESPIRATORY TRACT INFECTION, UNSPECIFIED TYPE: Primary | ICD-10-CM

## 2024-07-27 LAB — SARS-COV-2 RNA RESP QL NAA+PROBE: NOT DETECTED

## 2024-07-27 PROCEDURE — 99283 EMERGENCY DEPT VISIT LOW MDM: CPT

## 2024-07-27 PROCEDURE — 99284 EMERGENCY DEPT VISIT MOD MDM: CPT

## 2024-07-27 PROCEDURE — 71046 X-RAY EXAM CHEST 2 VIEWS: CPT | Performed by: EMERGENCY MEDICINE

## 2024-07-27 RX ORDER — ALBUTEROL SULFATE 90 UG/1
2 AEROSOL, METERED RESPIRATORY (INHALATION) EVERY 4 HOURS PRN
Qty: 1 EACH | Refills: 0 | Status: SHIPPED | OUTPATIENT
Start: 2024-07-27 | End: 2024-08-26

## 2024-07-27 NOTE — ED INITIAL ASSESSMENT (HPI)
Patient reports cough at night time that started about a week ago. Productive cough with white phlegm. Denies chest pain and shortness of breath. Denies fevers and chills. States \"I just want to get it checked out. Once I start coughing I can't go back to sleep.\"

## 2024-07-27 NOTE — DISCHARGE INSTRUCTIONS
Follow-up for further evaluation with primary physician.  Call for an appointment.  Return if new or worse symptoms.  Rest, plenty fluids.  Albuterol as needed.

## 2024-07-27 NOTE — ED PROVIDER NOTES
Patient Seen in: [facility] Emergency Department      History     Chief Complaint   Patient presents with    Cough/URI     Stated Complaint: Coughing, congestion, \"spitting up stuff\"    Subjective:   HPI    Patient is a 89-year-old male who states for the past 4 to 5 days he has had cough.  Patient states productive of whitish phlegm.  Patient denies nasal congestion, no sore throat, no fevers or chills.  Patient denies chest pain.  Patient unaware of sick contacts.  No abdominal pain, no vomiting diarrhea.  Remainder of review of systems negative.    Objective:   Past Medical History:    Bleeding ulcer    Blood disorder    anemia    Essential hypertension    Exposure to medical diagnostic radiation    radioactive seed implant    Heart murmur    Osteoarthritis    back    Prostate CA (HCC)    Visual impairment    glasses              Past Surgical History:   Procedure Laterality Date    Repair rotator cuff,acute      both shoulders                Social History     Socioeconomic History    Marital status:    Tobacco Use    Smoking status: Never    Smokeless tobacco: Never   Vaping Use    Vaping status: Never Used   Substance and Sexual Activity    Alcohol use: No    Drug use: No     Social Determinants of Health      Received from [facility], [facility]    Social Connections    Received from [facility], [facility]    Housing Stability              Review of Systems    Positive for stated Chief Complaint: Cough/URI    Other systems are as noted in HPI.  Constitutional and vital signs reviewed.      All other systems reviewed and negative except as noted above.    Physical Exam     ED Triage Vitals [07/27/24 1627]   /65   Pulse 64   Resp 18   Temp 97.9 °F (36.6 °C)   Temp src Oral   SpO2 96 %   O2 Device None (Room air)       Current Vitals:   Vital Signs  BP: 144/65  Pulse: 64  Resp: 18  Temp: 97.9 °F (36.6 °C)  Temp  src: Oral    Oxygen Therapy  SpO2: 96 %  O2 Device: None (Room air)            Physical Exam  GENERAL: Patient resting comfortably on the cart in no acute distress.  HEENT: Extraocular muscles intact, pupils equal round reactive to light and accommodation.  Mouth normal, neck supple, no meningismus.  LUNGS: Lungs clear to auscultation bilaterally.  CARDIOVASCULAR: + S1-S2, regular rate and rhythm, no murmurs.  BACK: No CVA tenderness, no midline bony tenderness.  ABDOMEN: + Bowel sounds, soft, nontender, nondistended.  No rebound, no guarding, no hepatosplenomegaly.  EXTREMITIES: Full range of motion, no tenderness, good capillary refill.  SKIN: No rash, good turgor.  NEURO: Patient answers questions appropriately.  No focal deficits appreciated.           ED Course     Labs Reviewed   RAPID SARS-COV-2 BY PCR - Normal             Chest x-rayNo focal consolidation.   Independent reviewed by myself, no pneumothorax         MDM      Patient had a COVID test which was negative.  Patient stable during stay in the emergency department.  Patient had a chest x-ray which was negative.  Patient felt comfortable going home.  Patient likely viral URI.  Recommend symptomatic treatment, rest, plenty fluids.  May use albuterol as discussed.  Rest, Tylenol as needed.  Return if new or worse symptoms.  Follow-up for further evaluation with primary physician.  I did consider pneumonia, pneumothorax, COVID, viral URI.                                   Medical Decision Making      Disposition and Plan     Clinical Impression:  1. Upper respiratory tract infection, unspecified type         Disposition:  Discharge  7/27/2024  5:52 pm    Follow-up:  Gini Breaux MD  [address] [place] [id number]  [phone]    Follow up in 2 day(s)            Medications Prescribed:  Current Discharge Medication List        START taking these medications    Details   albuterol 108 (90 Base) MCG/ACT Inhalation Aero Soln Inhale  2 puffs into the lungs every 4 (four) hours as needed for Wheezing.  Qty: 1 each, Refills: 0

## 2024-09-09 ENCOUNTER — HOSPITAL ENCOUNTER (EMERGENCY)
Facility: HOSPITAL | Age: 89
Discharge: LEFT BEFORE DISCHARGE | End: 2024-09-09
Payer: MEDICARE

## 2024-09-09 VITALS
WEIGHT: 150 LBS | RESPIRATION RATE: 16 BRPM | BODY MASS INDEX: 24.11 KG/M2 | DIASTOLIC BLOOD PRESSURE: 84 MMHG | OXYGEN SATURATION: 97 % | TEMPERATURE: 98 F | SYSTOLIC BLOOD PRESSURE: 195 MMHG | HEIGHT: 66 IN | HEART RATE: 55 BPM

## 2024-09-12 ENCOUNTER — HOSPITAL ENCOUNTER (EMERGENCY)
Facility: HOSPITAL | Age: 89
Discharge: HOME | End: 2024-09-12
Payer: MEDICARE

## 2024-09-12 VITALS
HEART RATE: 57 BPM | HEIGHT: 66 IN | DIASTOLIC BLOOD PRESSURE: 70 MMHG | RESPIRATION RATE: 18 BRPM | TEMPERATURE: 98 F | SYSTOLIC BLOOD PRESSURE: 152 MMHG | WEIGHT: 145 LBS | OXYGEN SATURATION: 98 % | BODY MASS INDEX: 23.3 KG/M2

## 2024-09-12 NOTE — ED INITIAL ASSESSMENT (HPI)
A&Ox3 patient p/w intermittent n/t to R hand x1 mo    Patient reports n/t primarily to 3rd and 4th digit, but will spread up to hand intermittently     Denies any cp/sob/n/v/d/c/f/LH/vision changes/urinary sx at this time    RR even/NL, speaking in full clear sentences, ambulatory w/ steady gait

## 2024-09-16 ENCOUNTER — WALK IN (OUTPATIENT)
Dept: URGENT CARE | Age: 89
End: 2024-09-16

## 2024-09-16 VITALS
OXYGEN SATURATION: 99 % | SYSTOLIC BLOOD PRESSURE: 114 MMHG | RESPIRATION RATE: 16 BRPM | TEMPERATURE: 98.1 F | HEART RATE: 61 BPM | DIASTOLIC BLOOD PRESSURE: 54 MMHG

## 2024-09-16 DIAGNOSIS — R20.2 PARESTHESIAS: Primary | ICD-10-CM

## 2024-09-16 PROCEDURE — 99213 OFFICE O/P EST LOW 20 MIN: CPT | Performed by: FAMILY MEDICINE

## 2024-10-26 ENCOUNTER — APPOINTMENT (OUTPATIENT)
Dept: GENERAL RADIOLOGY | Facility: HOSPITAL | Age: 89
End: 2024-10-26
Attending: EMERGENCY MEDICINE
Payer: MEDICARE

## 2024-10-26 ENCOUNTER — HOSPITAL ENCOUNTER (EMERGENCY)
Facility: HOSPITAL | Age: 89
Discharge: HOME | End: 2024-10-26
Attending: EMERGENCY MEDICINE
Payer: MEDICARE

## 2024-10-26 ENCOUNTER — HOSPITAL ENCOUNTER (EMERGENCY)
Age: 89
Discharge: STILL ADMITTED OR ADMINISTRATIVE | End: 2024-10-26
Payer: MEDICARE

## 2024-10-26 VITALS
RESPIRATION RATE: 18 BRPM | HEIGHT: 66 IN | WEIGHT: 150 LBS | TEMPERATURE: 97 F | BODY MASS INDEX: 24.11 KG/M2 | SYSTOLIC BLOOD PRESSURE: 170 MMHG | DIASTOLIC BLOOD PRESSURE: 84 MMHG | OXYGEN SATURATION: 100 % | HEART RATE: 56 BPM

## 2024-10-26 DIAGNOSIS — S70.01XA CONTUSION OF RIGHT HIP, INITIAL ENCOUNTER: Primary | ICD-10-CM

## 2024-10-26 DIAGNOSIS — S50.01XA CONTUSION OF RIGHT ELBOW, INITIAL ENCOUNTER: ICD-10-CM

## 2024-10-26 PROCEDURE — 99284 EMERGENCY DEPT VISIT MOD MDM: CPT

## 2024-10-26 PROCEDURE — 73080 X-RAY EXAM OF ELBOW: CPT | Performed by: EMERGENCY MEDICINE

## 2024-10-26 PROCEDURE — 99283 EMERGENCY DEPT VISIT LOW MDM: CPT

## 2024-10-26 PROCEDURE — 73502 X-RAY EXAM HIP UNI 2-3 VIEWS: CPT | Performed by: EMERGENCY MEDICINE

## 2024-10-26 NOTE — ED INITIAL ASSESSMENT (HPI)
Pt states he tripped and fell in a parking lot 3 days ago. Pt c/o right elbow pain and right hip pain.

## 2024-10-26 NOTE — ED PROVIDER NOTES
Patient Seen in: [facility] Emergency Department      History     Chief Complaint   Patient presents with    Pain     Fell 3 days ago, right hip and elbow pain      Stated Complaint: pain, fell 3 days ago, right elbow and hip pain    Subjective:   HPI      89-year-old male past medical history of hypertension anemia who is a [employer] now presents to ED with complaints of right hip pain and right elbow pain.  Reports that he slipped and fell backwards landing on his right thigh and elbow denies trauma to his head no loss of consciousness.  Denies any headache neck pain or back pain.  Complaining of right-sided hip pain right elbow pain.  Has been ambulatory.  Reports that he still drives and drove himself to the ED tonight.    Objective:     Past Medical History:    Bleeding ulcer    Blood disorder    anemia    Essential hypertension    Exposure to medical diagnostic radiation    radioactive seed implant    Heart murmur    Osteoarthritis    back    Prostate CA (HCC)    Visual impairment    glasses              Past Surgical History:   Procedure Laterality Date    Repair rotator cuff,acute      both shoulders                Social History     Socioeconomic History    Marital status:    Tobacco Use    Smoking status: Never    Smokeless tobacco: Never   Vaping Use    Vaping status: Never Used   Substance and Sexual Activity    Alcohol use: No    Drug use: No     Social Drivers of Health      Received from [facility], [facility]    Social Connections    Received from [facility], [facility]    Housing Stability                  Physical Exam     ED Triage Vitals [10/26/24 0137]   /74   Pulse 64   Resp 18   Temp 97.1 °F (36.2 °C)   Temp src Temporal   SpO2 97 %   O2 Device None (Room air)       Current Vitals:   Vital Signs  BP: 154/74  Pulse: 64  Resp: 18  Temp: 97.1 °F (36.2 °C)  Temp src: Temporal  MAP (mmHg):  (!) 101    Oxygen Therapy  SpO2: 97 %  O2 Device: None (Room air)        Physical Exam  Vitals and nursing note reviewed.   Constitutional:       Appearance: He is well-developed.   HENT:      Head: Normocephalic and atraumatic.   Eyes:      Pupils: Pupils are equal, round, and reactive to light.   Cardiovascular:      Rate and Rhythm: Normal rate and regular rhythm.   Pulmonary:      Effort: Pulmonary effort is normal.      Breath sounds: Normal breath sounds.   Abdominal:      General: Bowel sounds are normal.      Palpations: Abdomen is soft.   Musculoskeletal:         General: No deformity.      Cervical back: Normal range of motion and neck supple.      Comments: Contusion overlying olecranon process full range of motion without any bony tenderness palpation, right hip posterior lateral aspect with ecchymosis however full range of motion full flexion extension abduction and adduction of the hip   Skin:     General: Skin is warm and dry.      Capillary Refill: Capillary refill takes less than 2 seconds.   Neurological:      Mental Status: He is alert and oriented to person, place, and time.             ED Course   Labs Reviewed - No data to display             X-rays of the right elbow, pelvis, and right hip    IMPRESSION:  Right elbow:  No acute fracture or malalignment.  Prominent olecranon enthesophytes.  Degenerative changes of the elbow.    Pelvis and right hip:  No acute fracture or malalignment.  Mild degenerative changes of the bilateral hips. Brachytherapy seeds in the prostate.        MDM      This is a 89-year-old male who presents ED with complaints of right hip pain right elbow pain.  Vital signs stable arrival patient appears nontoxic examination of midline CTL spine tenderness patient step-offs deformities right lateral hip with ecchymosis however full range of motion of the right hip.  Right elbow with Harvey contusion olecranon process full range of motion.  X-ray of the right elbow and right hip  without any acute fracture dislocation deformity per my independent evaluation.  Likely contusion.  Discussed supportive care close follow-up strict return precautions.  Patient shows understanding of plan and is in agreement plan discharged home stable condition.        Medical Decision Making      Disposition and Plan     Clinical Impression:  1. Contusion of right hip, initial encounter    2. Contusion of right elbow, initial encounter         Disposition:  Discharge  10/26/2024  3:31 am    Follow-up:  Gini Breaux MD  [address] [place] [id number] [phone]    Call in 1 day(s)            Medications Prescribed:  Current Discharge Medication List              Supplementary Documentation:

## 2024-10-26 NOTE — DISCHARGE INSTRUCTIONS
Please follow-up with your primary care physician 1-2 days return to the ER if your symptoms worsen progress or if you have any further concerns.  Please drink lots of fluids get plenty of rest take acetaminophen 650 mg every 6 hours as needed for pain control.  You can apply ice or heat packs to the area for pain control.   Detail Level: Simple Azithromycin Counseling:  I discussed with the patient the risks of azithromycin including but not limited to GI upset, allergic reaction, drug rash, diarrhea, and yeast infections. Doxycycline Pregnancy And Lactation Text: This medication is Pregnancy Category D and not consider safe during pregnancy. It is also excreted in breast milk but is considered safe for shorter treatment courses. Detail Level: Zone Sarecycline Counseling: Patient advised regarding possible photosensitivity and discoloration of the teeth, skin, lips, tongue and gums.  Patient instructed to avoid sunlight, if possible.  When exposed to sunlight, patients should wear protective clothing, sunglasses, and sunscreen.  The patient was instructed to call the office immediately if the following severe adverse effects occur:  hearing changes, easy bruising/bleeding, severe headache, or vision changes.  The patient verbalized understanding of the proper use and possible adverse effects of sarecycline.  All of the patient's questions and concerns were addressed. Topical Retinoid Pregnancy And Lactation Text: This medication is Pregnancy Category C. It is unknown if this medication is excreted in breast milk. Birth Control Pills Counseling: Birth Control Pill Counseling: I discussed with the patient the potential side effects of OCPs including but not limited to increased risk of stroke, heart attack, thrombophlebitis, deep venous thrombosis, hepatic adenomas, breast changes, GI upset, headaches, and depression.  The patient verbalized understanding of the proper use and possible adverse effects of OCPs. All of the patient's questions and concerns were addressed. Tetracycline Counseling: Patient counseled regarding possible photosensitivity and increased risk for sunburn.  Patient instructed to avoid sunlight, if possible.  When exposed to sunlight, patients should wear protective clothing, sunglasses, and sunscreen.  The patient was instructed to call the office immediately if the following severe adverse effects occur:  hearing changes, easy bruising/bleeding, severe headache, or vision changes.  The patient verbalized understanding of the proper use and possible adverse effects of tetracycline.  All of the patient's questions and concerns were addressed. Patient understands to avoid pregnancy while on therapy due to potential birth defects. Isotretinoin Pregnancy And Lactation Text: This medication is Pregnancy Category X and is considered extremely dangerous during pregnancy. It is unknown if it is excreted in breast milk. Topical Clindamycin Pregnancy And Lactation Text: This medication is Pregnancy Category B and is considered safe during pregnancy. It is unknown if it is excreted in breast milk. Use Enhanced Medication Counseling?: No Azithromycin Pregnancy And Lactation Text: This medication is considered safe during pregnancy and is also secreted in breast milk. Topical Sulfur Applications Pregnancy And Lactation Text: This medication is Pregnancy Category C and has an unknown safety profile during pregnancy. It is unknown if this topical medication is excreted in breast milk. Erythromycin Counseling:  I discussed with the patient the risks of erythromycin including but not limited to GI upset, allergic reaction, drug rash, diarrhea, increase in liver enzymes, and yeast infections. Sarecycline Pregnancy And Lactation Text: This medication is Pregnancy Category D and not consider safe during pregnancy. It is also excreted in breast milk. Tazorac Counseling:  Patient advised that medication is irritating and drying.  Patient may need to apply sparingly and wash off after an hour before eventually leaving it on overnight.  The patient verbalized understanding of the proper use and possible adverse effects of tazorac.  All of the patient's questions and concerns were addressed. High Dose Vitamin A Counseling: Side effects reviewed, pt to contact office should one occur. Topical Sulfur Applications Counseling: Topical Sulfur Counseling: Patient counseled that this medication may cause skin irritation or allergic reactions.  In the event of skin irritation, the patient was advised to reduce the amount of the drug applied or use it less frequently.   The patient verbalized understanding of the proper use and possible adverse effects of topical sulfur application.  All of the patient's questions and concerns were addressed. Birth Control Pills Pregnancy And Lactation Text: This medication should be avoided if pregnant and for the first 30 days post-partum. Benzoyl Peroxide Pregnancy And Lactation Text: This medication is Pregnancy Category C. It is unknown if benzoyl peroxide is excreted in breast milk. Bactrim Counseling:  I discussed with the patient the risks of sulfa antibiotics including but not limited to GI upset, allergic reaction, drug rash, diarrhea, dizziness, photosensitivity, and yeast infections.  Rarely, more serious reactions can occur including but not limited to aplastic anemia, agranulocytosis, methemoglobinemia, blood dyscrasias, liver or kidney failure, lung infiltrates or desquamative/blistering drug rashes. Tazorac Pregnancy And Lactation Text: This medication is not safe during pregnancy. It is unknown if this medication is excreted in breast milk. Erythromycin Pregnancy And Lactation Text: This medication is Pregnancy Category B and is considered safe during pregnancy. It is also excreted in breast milk. Dapsone Counseling: I discussed with the patient the risks of dapsone including but not limited to hemolytic anemia, agranulocytosis, rashes, methemoglobinemia, kidney failure, peripheral neuropathy, headaches, GI upset, and liver toxicity.  Patients who start dapsone require monitoring including baseline LFTs and weekly CBCs for the first month, then every month thereafter.  The patient verbalized understanding of the proper use and possible adverse effects of dapsone.  All of the patient's questions and concerns were addressed. Spironolactone Counseling: Patient advised regarding risks of diarrhea, abdominal pain, hyperkalemia, birth defects (for female patients), liver toxicity and renal toxicity. The patient may need blood work to monitor liver and kidney function and potassium levels while on therapy. The patient verbalized understanding of the proper use and possible adverse effects of spironolactone.  All of the patient's questions and concerns were addressed. High Dose Vitamin A Pregnancy And Lactation Text: High dose vitamin A therapy is contraindicated during pregnancy and breast feeding. Benzoyl Peroxide Counseling: Patient counseled that medicine may cause skin irritation and bleach clothing.  In the event of skin irritation, the patient was advised to reduce the amount of the drug applied or use it less frequently.   The patient verbalized understanding of the proper use and possible adverse effects of benzoyl peroxide.  All of the patient's questions and concerns were addressed. Doxycycline Counseling:  Patient counseled regarding possible photosensitivity and increased risk for sunburn.  Patient instructed to avoid sunlight, if possible.  When exposed to sunlight, patients should wear protective clothing, sunglasses, and sunscreen.  The patient was instructed to call the office immediately if the following severe adverse effects occur:  hearing changes, easy bruising/bleeding, severe headache, or vision changes.  The patient verbalized understanding of the proper use and possible adverse effects of doxycycline.  All of the patient's questions and concerns were addressed. Topical Retinoid counseling:  Patient advised to apply a pea-sized amount only at bedtime and wait 30 minutes after washing their face before applying.  If too drying, patient may add a non-comedogenic moisturizer. The patient verbalized understanding of the proper use and possible adverse effects of retinoids.  All of the patient's questions and concerns were addressed. Spironolactone Pregnancy And Lactation Text: This medication can cause feminization of the male fetus and should be avoided during pregnancy. The active metabolite is also found in breast milk. Topical Clindamycin Counseling: Patient counseled that this medication may cause skin irritation or allergic reactions.  In the event of skin irritation, the patient was advised to reduce the amount of the drug applied or use it less frequently.   The patient verbalized understanding of the proper use and possible adverse effects of clindamycin.  All of the patient's questions and concerns were addressed. Bactrim Pregnancy And Lactation Text: This medication is Pregnancy Category D and is known to cause fetal risk.  It is also excreted in breast milk. Isotretinoin Counseling: Patient should get monthly blood tests, not donate blood, not drive at night if vision affected, not share medication, and not undergo elective surgery for 6 months after tx completed. Side effects reviewed, pt to contact office should one occur. Dapsone Pregnancy And Lactation Text: This medication is Pregnancy Category C and is not considered safe during pregnancy or breast feeding. Minocycline Counseling: Patient advised regarding possible photosensitivity and discoloration of the teeth, skin, lips, tongue and gums.  Patient instructed to avoid sunlight, if possible.  When exposed to sunlight, patients should wear protective clothing, sunglasses, and sunscreen.  The patient was instructed to call the office immediately if the following severe adverse effects occur:  hearing changes, easy bruising/bleeding, severe headache, or vision changes.  The patient verbalized understanding of the proper use and possible adverse effects of minocycline.  All of the patient's questions and concerns were addressed.

## 2024-12-15 ENCOUNTER — HOSPITAL ENCOUNTER (EMERGENCY)
Facility: HOSPITAL | Age: 89
Discharge: HOME | End: 2024-12-15
Attending: STUDENT IN AN ORGANIZED HEALTH CARE EDUCATION/TRAINING PROGRAM
Payer: MEDICARE

## 2024-12-15 VITALS
BODY MASS INDEX: 24.11 KG/M2 | HEIGHT: 66 IN | RESPIRATION RATE: 18 BRPM | WEIGHT: 150 LBS | OXYGEN SATURATION: 99 % | DIASTOLIC BLOOD PRESSURE: 68 MMHG | HEART RATE: 72 BPM | SYSTOLIC BLOOD PRESSURE: 161 MMHG | TEMPERATURE: 99 F

## 2024-12-15 DIAGNOSIS — R20.2 PARESTHESIAS: ICD-10-CM

## 2024-12-15 DIAGNOSIS — M79.644 FINGER PAIN, RIGHT: Primary | ICD-10-CM

## 2024-12-15 PROCEDURE — 99283 EMERGENCY DEPT VISIT LOW MDM: CPT

## 2024-12-15 PROCEDURE — 99282 EMERGENCY DEPT VISIT SF MDM: CPT

## 2024-12-15 NOTE — ED PROVIDER NOTES
Patient Seen in: [facility] Emergency Department      History     Chief Complaint   Patient presents with    Numbness     Stated Complaint: right hand fingers are numb. has been here for this before    Subjective:   HPI      The patient is a 89 y/o pleasant M presenting to the ED with complaints of right hand numbness in digits 3 and 4. h    Objective:     Past Medical History:    Bleeding ulcer    Blood disorder    anemia    Essential hypertension    Exposure to medical diagnostic radiation    radioactive seed implant    Heart murmur    Osteoarthritis    back    Prostate CA (HCC)    Visual impairment    glasses              Past Surgical History:   Procedure Laterality Date    Repair rotator cuff,acute      both shoulders                Social History     Socioeconomic History    Marital status:    Tobacco Use    Smoking status: Never    Smokeless tobacco: Never   Vaping Use    Vaping status: Never Used   Substance and Sexual Activity    Alcohol use: No    Drug use: No     Social Drivers of Health      Received [facility], [facility]    Social Connections    Received from [facility], [facility]    Housing Stability                  Physical Exam     ED Triage Vitals [12/15/24 1458]   BP (!) 161/68   Pulse 72   Resp 18   Temp 98.7 °F (37.1 °C)   Temp src Temporal   SpO2 99 %   O2 Device None (Room air)       Current Vitals:   Vital Signs  BP: (!) 161/68  Pulse: 72  Resp: 18  Temp: 98.7 °F (37.1 °C)  Temp src: Temporal    Oxygen Therapy  SpO2: 99 %  O2 Device: None (Room air)        Physical Exam  ***      ED Course   Labs Reviewed - No data to display         ***       MDM      MDM  The differential includes the following    Pertinent comorbidities include    Pertinent social history includes      Labs    Imaging studies  I reviewed the images and my independent interpreation after review is ***. Additionaly, I reviewd  the radiology report that states the following ***    External data reviewed  ER visit from 102/6 for elbow contusion   ER visit at [facility] reports 9/16/24 numbness to the digits 3-4 on the right hand       Discussion of management with external providers      ER course  Patient's initial vitals  Disposition and Plan     Clinical Impression:  No diagnosis found.     Disposition:  There is no disposition on file for this visit.  There is no disposition time on file for this visit.    Follow-up:  No follow-up provider specified.        Medications Prescribed:  Current Discharge Medication List              Supplementary Documentation:                                                                Disposition:  Discharge  12/15/2024  4:51 pm    Follow-up:  Gini Breaux MD  [address] [place] [id number] [phone]    Follow up      Suleiman Connell DO  [address] [place] [id number] [phone]    Follow up            Medications Prescribed:  Discharge Medication List as of 12/15/2024  5:02 PM              Supplementary Documentation:

## 2024-12-15 NOTE — ED INITIAL ASSESSMENT (HPI)
Numbness on right hand fingers since 8 months . Patient was seen here  before   for this same problem , no weakness , ambulatory with steady gait

## 2024-12-22 ENCOUNTER — WALK IN (OUTPATIENT)
Dept: URGENT CARE | Age: 89
End: 2024-12-22

## 2024-12-22 VITALS
HEART RATE: 78 BPM | TEMPERATURE: 97.8 F | DIASTOLIC BLOOD PRESSURE: 52 MMHG | RESPIRATION RATE: 16 BRPM | SYSTOLIC BLOOD PRESSURE: 104 MMHG | OXYGEN SATURATION: 100 %

## 2024-12-22 DIAGNOSIS — J02.9 SORE THROAT: Primary | ICD-10-CM

## 2024-12-22 PROCEDURE — 99214 OFFICE O/P EST MOD 30 MIN: CPT | Performed by: FAMILY MEDICINE

## 2024-12-22 RX ORDER — AMOXICILLIN 875 MG/1
875 TABLET, COATED ORAL 2 TIMES DAILY
Qty: 14 TABLET | Refills: 0 | Status: SHIPPED | OUTPATIENT
Start: 2024-12-22 | End: 2024-12-29

## 2025-01-11 ENCOUNTER — WALK IN (OUTPATIENT)
Dept: URGENT CARE | Age: OVER 89
End: 2025-01-11

## 2025-01-11 VITALS
HEART RATE: 72 BPM | DIASTOLIC BLOOD PRESSURE: 58 MMHG | OXYGEN SATURATION: 98 % | RESPIRATION RATE: 16 BRPM | SYSTOLIC BLOOD PRESSURE: 110 MMHG | TEMPERATURE: 97.1 F

## 2025-01-11 DIAGNOSIS — R09.82 POST-NASAL DRIP: Primary | ICD-10-CM

## 2025-01-11 RX ORDER — FLUTICASONE PROPIONATE 50 MCG
2 SPRAY, SUSPENSION (ML) NASAL DAILY
Qty: 16 G | Refills: 0 | Status: SHIPPED | OUTPATIENT
Start: 2025-01-11

## 2025-01-27 ENCOUNTER — HOSPITAL ENCOUNTER (EMERGENCY)
Facility: HOSPITAL | Age: OVER 89
Discharge: LEFT BEFORE DISCHARGE | End: 2025-01-27
Payer: MEDICARE

## 2025-01-27 VITALS
HEART RATE: 84 BPM | TEMPERATURE: 99 F | BODY MASS INDEX: 25 KG/M2 | RESPIRATION RATE: 18 BRPM | OXYGEN SATURATION: 97 % | WEIGHT: 155 LBS

## 2025-01-27 PROCEDURE — S0119 ONDANSETRON 4 MG: HCPCS

## 2025-01-27 PROCEDURE — 99281 EMR DPT VST MAYX REQ PHY/QHP: CPT

## 2025-01-27 RX ORDER — ONDANSETRON 4 MG/1
4 TABLET, ORALLY DISINTEGRATING ORAL ONCE
Status: COMPLETED | OUTPATIENT
Start: 2025-01-27 | End: 2025-01-27

## 2025-01-27 NOTE — ED INITIAL ASSESSMENT (HPI)
Pt reports from home with n/v, no diarrhea. Pt actively vomiting in triage. Pt states going to [place] earlier today and beliefs he ate something bad. Was not feeling sick prior to eating.

## 2025-01-31 ENCOUNTER — HOSPITAL ENCOUNTER (OUTPATIENT)
Facility: HOSPITAL | Age: OVER 89
Setting detail: OBSERVATION
Discharge: HOME | End: 2025-02-02
Attending: EMERGENCY MEDICINE | Admitting: STUDENT IN AN ORGANIZED HEALTH CARE EDUCATION/TRAINING PROGRAM
Payer: MEDICARE

## 2025-01-31 ENCOUNTER — APPOINTMENT (OUTPATIENT)
Dept: CT IMAGING | Facility: HOSPITAL | Age: OVER 89
End: 2025-01-31
Attending: EMERGENCY MEDICINE
Payer: MEDICARE

## 2025-01-31 DIAGNOSIS — R10.9 ABDOMINAL PAIN OF UNKNOWN ETIOLOGY: Primary | ICD-10-CM

## 2025-01-31 PROBLEM — I10 PRIMARY HYPERTENSION: Status: ACTIVE | Noted: 2025-01-31

## 2025-01-31 PROBLEM — R11.2 NAUSEA AND VOMITING: Status: ACTIVE | Noted: 2025-01-31

## 2025-01-31 PROBLEM — K55.1 SMAS (SUPERIOR MESENTERIC ARTERY SYNDROME) (HCC): Status: ACTIVE | Noted: 2021-08-12

## 2025-01-31 LAB
ALBUMIN SERPL-MCNC: 4.6 G/DL (ref 3.2–4.8)
ALBUMIN/GLOB SERPL: 1.4 {RATIO} (ref 1–2)
ALP LIVER SERPL-CCNC: 61 U/L
ALT SERPL-CCNC: 21 U/L
ANION GAP SERPL CALC-SCNC: 2 MMOL/L (ref 0–18)
AST SERPL-CCNC: 23 U/L (ref ?–34)
BASOPHILS # BLD AUTO: 0.03 X10(3) UL (ref 0–0.2)
BASOPHILS NFR BLD AUTO: 0.4 %
BILIRUB SERPL-MCNC: 0.4 MG/DL (ref 0.2–0.9)
BILIRUB UR QL STRIP.AUTO: NEGATIVE
BUN BLD-MCNC: 16 MG/DL (ref 9–23)
CALCIUM BLD-MCNC: 10.6 MG/DL (ref 8.7–10.6)
CHLORIDE SERPL-SCNC: 107 MMOL/L (ref 98–112)
CLARITY UR REFRACT.AUTO: CLEAR
CO2 SERPL-SCNC: 33 MMOL/L (ref 21–32)
CREAT BLD-MCNC: 1.27 MG/DL
EGFRCR SERPLBLD CKD-EPI 2021: 53 ML/MIN/1.73M2 (ref 60–?)
EOSINOPHIL # BLD AUTO: 0.07 X10(3) UL (ref 0–0.7)
EOSINOPHIL NFR BLD AUTO: 0.9 %
ERYTHROCYTE [DISTWIDTH] IN BLOOD BY AUTOMATED COUNT: 15.5 %
GLOBULIN PLAS-MCNC: 3.2 G/DL (ref 2–3.5)
GLUCOSE BLD-MCNC: 158 MG/DL (ref 70–99)
GLUCOSE UR STRIP.AUTO-MCNC: NORMAL MG/DL
HCT VFR BLD AUTO: 37.2 %
HGB BLD-MCNC: 11.8 G/DL
IMM GRANULOCYTES # BLD AUTO: 0.02 X10(3) UL (ref 0–1)
IMM GRANULOCYTES NFR BLD: 0.3 %
KETONES UR STRIP.AUTO-MCNC: NEGATIVE MG/DL
LEUKOCYTE ESTERASE UR QL STRIP.AUTO: NEGATIVE
LIPASE SERPL-CCNC: 255 U/L (ref 12–53)
LYMPHOCYTES # BLD AUTO: 1.33 X10(3) UL (ref 1–4)
LYMPHOCYTES NFR BLD AUTO: 17.5 %
MCH RBC QN AUTO: 23.3 PG (ref 26–34)
MCHC RBC AUTO-ENTMCNC: 31.7 G/DL (ref 31–37)
MCV RBC AUTO: 73.4 FL
MONOCYTES # BLD AUTO: 0.64 X10(3) UL (ref 0.1–1)
MONOCYTES NFR BLD AUTO: 8.4 %
NEUTROPHILS # BLD AUTO: 5.49 X10 (3) UL (ref 1.5–7.7)
NEUTROPHILS # BLD AUTO: 5.49 X10(3) UL (ref 1.5–7.7)
NEUTROPHILS NFR BLD AUTO: 72.5 %
NITRITE UR QL STRIP.AUTO: NEGATIVE
OSMOLALITY SERPL CALC.SUM OF ELEC: 298 MOSM/KG (ref 275–295)
PH UR STRIP.AUTO: 7 [PH] (ref 5–8)
PLATELET # BLD AUTO: 136 10(3)UL (ref 150–450)
PLATELET MORPHOLOGY: NORMAL
PLATELETS.RETICULATED NFR BLD AUTO: 9.9 % (ref 0–7)
POTASSIUM SERPL-SCNC: 4.2 MMOL/L (ref 3.5–5.1)
PROT SERPL-MCNC: 7.8 G/DL (ref 5.7–8.2)
PROT UR STRIP.AUTO-MCNC: NEGATIVE MG/DL
RBC # BLD AUTO: 5.07 X10(6)UL
SODIUM SERPL-SCNC: 142 MMOL/L (ref 136–145)
SP GR UR STRIP.AUTO: >1.03 (ref 1–1.03)
UROBILINOGEN UR STRIP.AUTO-MCNC: NORMAL MG/DL
WBC # BLD AUTO: 7.6 X10(3) UL (ref 4–11)

## 2025-01-31 PROCEDURE — 74177 CT ABD & PELVIS W/CONTRAST: CPT | Performed by: EMERGENCY MEDICINE

## 2025-01-31 PROCEDURE — 99223 1ST HOSP IP/OBS HIGH 75: CPT | Performed by: STUDENT IN AN ORGANIZED HEALTH CARE EDUCATION/TRAINING PROGRAM

## 2025-01-31 RX ORDER — ONDANSETRON 2 MG/ML
4 INJECTION INTRAMUSCULAR; INTRAVENOUS ONCE
Status: COMPLETED | OUTPATIENT
Start: 2025-01-31 | End: 2025-01-31

## 2025-01-31 RX ORDER — ACETAMINOPHEN 500 MG
1000 TABLET ORAL EVERY 8 HOURS PRN
Status: DISCONTINUED | OUTPATIENT
Start: 2025-01-31 | End: 2025-02-02

## 2025-01-31 RX ORDER — BISACODYL 10 MG
10 SUPPOSITORY, RECTAL RECTAL
Status: DISCONTINUED | OUTPATIENT
Start: 2025-01-31 | End: 2025-02-02

## 2025-01-31 RX ORDER — SENNOSIDES 8.6 MG
17.2 TABLET ORAL NIGHTLY PRN
Status: DISCONTINUED | OUTPATIENT
Start: 2025-01-31 | End: 2025-02-02

## 2025-01-31 RX ORDER — ECHINACEA PURPUREA EXTRACT 125 MG
1 TABLET ORAL
Status: DISCONTINUED | OUTPATIENT
Start: 2025-01-31 | End: 2025-02-02

## 2025-01-31 RX ORDER — SODIUM CHLORIDE, SODIUM LACTATE, POTASSIUM CHLORIDE, CALCIUM CHLORIDE 600; 310; 30; 20 MG/100ML; MG/100ML; MG/100ML; MG/100ML
INJECTION, SOLUTION INTRAVENOUS CONTINUOUS
Status: DISCONTINUED | OUTPATIENT
Start: 2025-02-01 | End: 2025-02-01

## 2025-01-31 RX ORDER — SODIUM CHLORIDE, SODIUM LACTATE, POTASSIUM CHLORIDE, CALCIUM CHLORIDE 600; 310; 30; 20 MG/100ML; MG/100ML; MG/100ML; MG/100ML
INJECTION, SOLUTION INTRAVENOUS CONTINUOUS
Status: DISCONTINUED | OUTPATIENT
Start: 2025-01-31 | End: 2025-01-31

## 2025-01-31 RX ORDER — BENZONATATE 100 MG/1
200 CAPSULE ORAL 3 TIMES DAILY PRN
Status: DISCONTINUED | OUTPATIENT
Start: 2025-01-31 | End: 2025-02-02

## 2025-01-31 RX ORDER — HYDRALAZINE HYDROCHLORIDE 20 MG/ML
10 INJECTION INTRAMUSCULAR; INTRAVENOUS EVERY 6 HOURS PRN
Status: DISCONTINUED | OUTPATIENT
Start: 2025-01-31 | End: 2025-02-02

## 2025-01-31 RX ORDER — FERROUS SULFATE 325(65) MG
325 TABLET, DELAYED RELEASE (ENTERIC COATED) ORAL EVERY MORNING
COMMUNITY

## 2025-01-31 RX ORDER — ONDANSETRON 2 MG/ML
4 INJECTION INTRAMUSCULAR; INTRAVENOUS EVERY 6 HOURS PRN
Status: DISCONTINUED | OUTPATIENT
Start: 2025-01-31 | End: 2025-02-02

## 2025-01-31 RX ORDER — AMLODIPINE BESYLATE 5 MG/1
5 TABLET ORAL DAILY
Status: DISCONTINUED | OUTPATIENT
Start: 2025-01-31 | End: 2025-02-02

## 2025-01-31 RX ORDER — ENOXAPARIN SODIUM 100 MG/ML
40 INJECTION SUBCUTANEOUS DAILY
Status: DISCONTINUED | OUTPATIENT
Start: 2025-01-31 | End: 2025-02-02

## 2025-01-31 RX ORDER — METOCLOPRAMIDE HYDROCHLORIDE 5 MG/ML
5 INJECTION INTRAMUSCULAR; INTRAVENOUS EVERY 8 HOURS PRN
Status: DISCONTINUED | OUTPATIENT
Start: 2025-01-31 | End: 2025-02-02

## 2025-01-31 RX ORDER — POLYETHYLENE GLYCOL 3350 17 G/17G
17 POWDER, FOR SOLUTION ORAL DAILY PRN
Status: DISCONTINUED | OUTPATIENT
Start: 2025-01-31 | End: 2025-02-02

## 2025-01-31 NOTE — H&P
[facility]     Luc CHER Slade Patient Status:  Emergency    10/4/1932 MRN [id number]   Location [facility] EMERGENCY DEPARTMENT Attending Masoud Alex MD   Hosp Day # 0 PCP Gini Breaux MD     Chief Complaint: Abdominal pain, nausea/vomiting    History of Present Illness: Luc Slade is a 89 year old male with PMHx HTN, who presents for abdominal pain and nausea.    Patient notes he was in normal state of health up until earlier today where after he had a large meal states that he had onset of epigastric abdominal pain with associated nausea and small amount of vomiting.  States nausea continued until he presented to the ER and after admission now feels abdominal pain and nausea fully resolved.  Nevers had similar symptoms before.  Denies any sick contacts, fevers, chills, chest pain, shortness of breath, diarrhea.    Past Medical History:  Past Medical History:    Bleeding ulcer    Blood disorder    anemia    Essential hypertension    Exposure to medical diagnostic radiation    radioactive seed implant    Heart murmur    Osteoarthritis    back    Prostate CA (HCC)    Visual impairment    glasses        Past Surgical History:   Past Surgical History:   Procedure Laterality Date    Repair rotator cuff,acute      both shoulders       Social History:  reports that he has never smoked. He has never used smokeless tobacco. He reports that he does not drink alcohol and does not use drugs.    Family History: History reviewed. No pertinent family history.    Allergies: Allergies[1]    Medications:  Medications Ordered Prior to Encounter[2]    Review of Systems:   A comprehensive 14 point review of systems was completed.    Pertinent positives and negatives noted in the HPI.    Physical Exam:    /65   Pulse 72   Temp 98.7 °F (37.1 °C) (Oral)   Resp 19   Ht 5' 6\" (1.676 m)   Wt 150 lb (68 kg)   SpO2 99%   BMI 24.21 kg/m²   General: No acute distress. Alert  and oriented x 3.  HEENT: Normocephalic atraumatic. Moist mucous membranes. EOM-I. PERRLA. Anicteric.  Neck: No lymphadenopathy. No JVD. No carotid bruits.  Respiratory: Clear to auscultation bilaterally. No wheezes. No rhonchi.  Cardiovascular: S1, S2. Regular rate and rhythm. No murmurs, rubs or gallops. Equal pulses.   Chest and Back: No tenderness or deformity.  Abdomen: Soft, nontender, nondistended.  Positive bowel sounds. No rebound, guarding or organomegaly.  Neurologic: No focal neurological deficits. CNII-XII grossly intact.  Musculoskeletal: Moves all extremities.  Extremities: No edema or cyanosis.  Integument: No rashes or lesions.   Psychiatric: Appropriate mood and affect.    Diagnostic Data:      Labs:  Recent Labs   Lab 01/31/25  0017   WBC 7.6   HGB 11.8*   MCV 73.4*   .0*       Recent Labs   Lab 01/31/25  0017   *   BUN 16   CREATSERUM 1.27   CA 10.6   ALB 4.6      K 4.2      CO2 33.0*   ALKPHO 61   AST 23   ALT 21   BILT 0.4   TP 7.8       Estimated Creatinine Clearance: 33.5 mL/min (based on SCr of 1.27 mg/dL).    No results for input(s): \"PTP\", \"INR\" in the last 168 hours.    No results for input(s): \"TROP\", \"CK\" in the last 168 hours.    Imaging: Imaging data reviewed in Epic.  No results found.      ASSESSMENT / PLAN:     # Epigastric abdominal pain   # Nausea/vomiting  # c/f SMA syndrome   - CLD, ADAT   - CT A/P with preliminary findings of dilatation of the stomach and duodenum with transition point at the transverse duodenum concerning for possible compression by the SMA   - US abdominal doppler ordered   - Vascular surgery, GI consulted     # HTN - off meds      Code Status: Not on file    Plan of care discussed with patient, ED physician    Raoul Serna MD  1/31/2025    Supplementary Documentation:      MDM : Patient's ER labs, imaging reviewed.  AM labs ordered.  ER management discussed with ED physician, decision made for patient to be admitted to  the hospital for further medical management.                  [1] No Known Allergies  [2]   Current Facility-Administered Medications on File Prior to Encounter   Medication Dose Route Frequency Provider Last Rate Last Admin    [COMPLETED] ondansetron (Zofran-ODT) disintegrating tab 4 mg  4 mg Oral Once Bc Tatum MD   4 mg at 01/27/25 0032     Current Outpatient Medications on File Prior to Encounter   Medication Sig Dispense Refill    amoxicillin 500 MG Oral Cap Take 1 capsule (500 mg total) by mouth 3 (three) times daily.      pantoprazole 40 MG Oral Tab EC Take one tablet (40 mg total) by mouth once daily, 30 minutes prior to breakfast. (Patient not taking: Reported on 5/2/2022) 90 tablet 1    tiZANidine 4 MG Oral Tab Take 4 mg by mouth as needed. (Patient not taking: Reported on 5/2/2022)      amLODIPine besylate 5 MG Oral Tab Take 5 mg by mouth daily. (Patient not taking: Reported on 5/2/2022)      docusate sodium 100 MG Oral Cap Take 100 mg by mouth 2 (two) times daily. (Patient not taking: Reported on 5/2/2022)      Ferrous Sulfate 324 (65 Fe) MG Oral Tab EC Take 1 tablet by mouth daily.   (Patient not taking: Reported on 5/2/2022)

## 2025-01-31 NOTE — PROGRESS NOTES
Admit from ED for abd pain and nausea. Upon admit patient denies all abd symptoms. Reports no pain. 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 GI and vascular surgery consulted. GI to take for EGD hopefully later today still if schedule allows. Made NPO. IV fluids continue. Updated on plan of care and condition update. All needs met on shift.

## 2025-01-31 NOTE — ED QUICK NOTES
Orders for admission, patient is aware of plan and ready to go upstairs. Any questions, please call ED RN Julissa at extension [phone].     Patient Covid vaccination status: Fully vaccinated     COVID Test Ordered in ED: None    COVID Suspicion at Admission: N/A    Running Infusions:  None    Mental Status/LOC at time of transport: A&Ox4    Other pertinent information: Pt ambulatory  CIWA score: N/A   NIH score:  N/A

## 2025-01-31 NOTE — ED PROVIDER NOTES
Patient Seen in: [facility] Emergency Department      History     Chief Complaint   Patient presents with    Nausea/Vomiting/Diarrhea     Stated Complaint: NVD    Subjective:   HPI      89-year-old male presenting to the emergency department for nausea and almost vomiting.  Patient states this evening he started feeling upset stomach feel like he has been to vomit he had no recent diarrhea no recent cough cold fevers chills or any other exacerbating or relieving factor.    Objective:     Past Medical History:    Bleeding ulcer    Blood disorder    anemia    Essential hypertension    Exposure to medical diagnostic radiation    radioactive seed implant    Heart murmur    Osteoarthritis    back    Prostate CA (HCC)    Visual impairment    glasses              Past Surgical History:   Procedure Laterality Date    Repair rotator cuff,acute      both shoulders                Social History     Socioeconomic History    Marital status:    Tobacco Use    Smoking status: Never    Smokeless tobacco: Never   Vaping Use    Vaping status: Never Used   Substance and Sexual Activity    Alcohol use: No    Drug use: No     Social Drivers of Health      [facility]    Social Connections    [facility]    Housing Stability                  Physical Exam     ED Triage Vitals [01/30/25 2352]   /68   Pulse 90   Resp 20   Temp 98.7 °F (37.1 °C)   Temp src Oral   SpO2 99 %   O2 Device None (Room air)       Current Vitals:   Vital Signs  BP: 132/65  Pulse: 72  Resp: 19  Temp: 98.7 °F (37.1 °C)  Temp src: Oral  MAP (mmHg): 85    Oxygen Therapy  SpO2: 99 %  O2 Device: None (Room air)        Physical Exam  Awake alert patient appears no distress HEENT exam is normal lungs are clear cardiovascular exam regular rhythm abdomen soft nontender extremities no COVID cyanosis or edema no rash    ED Course      Labs Reviewed   COMP METABOLIC PANEL (14) - Abnormal; Notable for the following components:       Result Value    Glucose 158 (*)     CO2 33.0 (*)     Calculated Osmolality 298 (*)     eGFR-Cr 53 (*)     All other components within normal limits   LIPASE - Abnormal; Notable for the following components:    Lipase 255 (*)     All other components within normal limits   CBC WITH DIFFERENTIAL WITH PLATELET - Abnormal; Notable for the following components:    HGB 11.8 (*)     HCT 37.2 (*)     .0 (*)     Immature Platelet Fraction 9.9 (*)     MCV 73.4 (*)     MCH 23.3 (*)     All other components within normal limits   RBC MORPHOLOGY SCAN - Abnormal; Notable for the following components:    RBC Morphology See morphology below (*)     All other components within normal limits   SCAN SLIDE   URINALYSIS WITH CULTURE REFLEX   RAINBOW DRAW BLUE            Differential diagnosis includes obstruction, perforation       MDM          Admission disposition: 1/31/2025  3:55 AM           Medical Decision Making  89-year-old male presenting with nausea IV established cardiac monitor shows a sinus rhythm pulse ox shows no signs of hypoxia.  CBC shows stable hemoglobin level metabolic panel stable renal function independent interpretation by ED physician of CT scan shows dilated stomach patient will be admitted for pain control and further evaluation.    Problems Addressed:  Abdominal pain of unknown etiology: acute illness or injury    Amount and/or Complexity of Data Reviewed  Labs: ordered. Decision-making details documented in ED Course.  Radiology: ordered and independent interpretation performed. Decision-making details documented in ED Course.  ECG/medicine tests: ordered and independent interpretation performed. Decision-making details documented in ED Course.        Disposition and Plan     Clinical Impression:  1. Abdominal pain of unknown etiology         Disposition:  Admit  1/31/2025  3:55 am    Follow-up:  No  follow-up provider specified.        Medications Prescribed:  Current Discharge Medication List              Supplementary Documentation:         Hospital Problems       Present on Admission  Date Reviewed: 1/31/2025            ICD-10-CM Noted POA    * (Principal) Abdominal pain of unknown etiology R10.9 1/31/2025 Unknown

## 2025-01-31 NOTE — ED INITIAL ASSESSMENT (HPI)
Pt c/o n/v, onset an hr PTA, \"a little bit of stomach pain.\" He denies diarrhea, last BM today. Pt denies urinary sxs

## 2025-02-01 ENCOUNTER — ANESTHESIA EVENT (OUTPATIENT)
Dept: ENDOSCOPY | Facility: HOSPITAL | Age: OVER 89
End: 2025-02-01
Payer: MEDICARE

## 2025-02-01 ENCOUNTER — APPOINTMENT (OUTPATIENT)
Dept: ULTRASOUND IMAGING | Facility: HOSPITAL | Age: OVER 89
End: 2025-02-01
Attending: STUDENT IN AN ORGANIZED HEALTH CARE EDUCATION/TRAINING PROGRAM
Payer: MEDICARE

## 2025-02-01 ENCOUNTER — ANESTHESIA (OUTPATIENT)
Dept: ENDOSCOPY | Facility: HOSPITAL | Age: OVER 89
End: 2025-02-01
Payer: MEDICARE

## 2025-02-01 LAB
ALBUMIN SERPL-MCNC: 3.8 G/DL (ref 3.2–4.8)
ALBUMIN/GLOB SERPL: 1.7 {RATIO} (ref 1–2)
ALP LIVER SERPL-CCNC: 49 U/L
ALT SERPL-CCNC: 15 U/L
ANION GAP SERPL CALC-SCNC: 7 MMOL/L (ref 0–18)
AST SERPL-CCNC: 17 U/L (ref ?–34)
BASOPHILS # BLD AUTO: 0.03 X10(3) UL (ref 0–0.2)
BASOPHILS NFR BLD AUTO: 0.5 %
BILIRUB SERPL-MCNC: 0.8 MG/DL (ref 0.2–0.9)
BUN BLD-MCNC: 10 MG/DL (ref 9–23)
CALCIUM BLD-MCNC: 9.4 MG/DL (ref 8.7–10.6)
CHLORIDE SERPL-SCNC: 109 MMOL/L (ref 98–112)
CO2 SERPL-SCNC: 28 MMOL/L (ref 21–32)
CREAT BLD-MCNC: 0.91 MG/DL
EGFRCR SERPLBLD CKD-EPI 2021: 79 ML/MIN/1.73M2 (ref 60–?)
EOSINOPHIL # BLD AUTO: 0.16 X10(3) UL (ref 0–0.7)
EOSINOPHIL NFR BLD AUTO: 2.7 %
ERYTHROCYTE [DISTWIDTH] IN BLOOD BY AUTOMATED COUNT: 15.2 %
GLOBULIN PLAS-MCNC: 2.3 G/DL (ref 2–3.5)
GLUCOSE BLD-MCNC: 91 MG/DL (ref 70–99)
HCT VFR BLD AUTO: 32.1 %
HGB BLD-MCNC: 10.4 G/DL
IMM GRANULOCYTES # BLD AUTO: 0.01 X10(3) UL (ref 0–1)
IMM GRANULOCYTES NFR BLD: 0.2 %
INR BLD: 1.14 (ref 0.8–1.2)
LYMPHOCYTES # BLD AUTO: 1.54 X10(3) UL (ref 1–4)
LYMPHOCYTES NFR BLD AUTO: 26.3 %
MAGNESIUM SERPL-MCNC: 2 MG/DL (ref 1.6–2.6)
MCH RBC QN AUTO: 23.4 PG (ref 26–34)
MCHC RBC AUTO-ENTMCNC: 32.4 G/DL (ref 31–37)
MCV RBC AUTO: 72.1 FL
MONOCYTES # BLD AUTO: 0.74 X10(3) UL (ref 0.1–1)
MONOCYTES NFR BLD AUTO: 12.6 %
NEUTROPHILS # BLD AUTO: 3.38 X10 (3) UL (ref 1.5–7.7)
NEUTROPHILS # BLD AUTO: 3.38 X10(3) UL (ref 1.5–7.7)
NEUTROPHILS NFR BLD AUTO: 57.7 %
OSMOLALITY SERPL CALC.SUM OF ELEC: 297 MOSM/KG (ref 275–295)
PHOSPHATE SERPL-MCNC: 3 MG/DL (ref 2.4–5.1)
PLATELET # BLD AUTO: 177 10(3)UL (ref 150–450)
PLATELETS.RETICULATED NFR BLD AUTO: 2.7 % (ref 0–7)
POTASSIUM SERPL-SCNC: 4 MMOL/L (ref 3.5–5.1)
PROT SERPL-MCNC: 6.1 G/DL (ref 5.7–8.2)
PROTHROMBIN TIME: 14.7 SECONDS (ref 11.6–14.8)
RBC # BLD AUTO: 4.45 X10(6)UL
SODIUM SERPL-SCNC: 144 MMOL/L (ref 136–145)
WBC # BLD AUTO: 5.9 X10(3) UL (ref 4–11)

## 2025-02-01 PROCEDURE — 93975 VASCULAR STUDY: CPT | Performed by: STUDENT IN AN ORGANIZED HEALTH CARE EDUCATION/TRAINING PROGRAM

## 2025-02-01 PROCEDURE — 99222 1ST HOSP IP/OBS MODERATE 55: CPT | Performed by: SURGERY

## 2025-02-01 PROCEDURE — 99232 SBSQ HOSP IP/OBS MODERATE 35: CPT | Performed by: HOSPITALIST

## 2025-02-01 PROCEDURE — 76700 US EXAM ABDOM COMPLETE: CPT | Performed by: STUDENT IN AN ORGANIZED HEALTH CARE EDUCATION/TRAINING PROGRAM

## 2025-02-01 PROCEDURE — 0DJ08ZZ INSPECTION OF UPPER INTESTINAL TRACT, VIA NATURAL OR ARTIFICIAL OPENING ENDOSCOPIC: ICD-10-PCS | Performed by: INTERNAL MEDICINE

## 2025-02-01 RX ORDER — NALOXONE HYDROCHLORIDE 0.4 MG/ML
0.08 INJECTION, SOLUTION INTRAMUSCULAR; INTRAVENOUS; SUBCUTANEOUS ONCE AS NEEDED
Status: DISCONTINUED | OUTPATIENT
Start: 2025-02-01 | End: 2025-02-01 | Stop reason: HOSPADM

## 2025-02-01 RX ORDER — LIDOCAINE HYDROCHLORIDE 10 MG/ML
INJECTION, SOLUTION EPIDURAL; INFILTRATION; INTRACAUDAL; PERINEURAL AS NEEDED
Status: DISCONTINUED | OUTPATIENT
Start: 2025-02-01 | End: 2025-02-01 | Stop reason: SURG

## 2025-02-01 RX ORDER — SODIUM CHLORIDE, SODIUM LACTATE, POTASSIUM CHLORIDE, CALCIUM CHLORIDE 600; 310; 30; 20 MG/100ML; MG/100ML; MG/100ML; MG/100ML
INJECTION, SOLUTION INTRAVENOUS CONTINUOUS
Status: DISCONTINUED | OUTPATIENT
Start: 2025-02-01 | End: 2025-02-01

## 2025-02-01 RX ADMIN — LIDOCAINE HYDROCHLORIDE 50 MG: 10 INJECTION, SOLUTION EPIDURAL; INFILTRATION; INTRACAUDAL; PERINEURAL at 10:47:00

## 2025-02-01 RX ADMIN — SODIUM CHLORIDE, SODIUM LACTATE, POTASSIUM CHLORIDE, CALCIUM CHLORIDE: 600; 310; 30; 20 INJECTION, SOLUTION INTRAVENOUS at 10:42:00

## 2025-02-01 RX ADMIN — SODIUM CHLORIDE, SODIUM LACTATE, POTASSIUM CHLORIDE, CALCIUM CHLORIDE: 600; 310; 30; 20 INJECTION, SOLUTION INTRAVENOUS at 11:08:00

## 2025-02-01 NOTE — PLAN OF CARE
Pt A&O. On room air. Hard of hearing w/ bilateral H/A at home. Tele and  monitoring maintained. Refusing Scds. Tolerating soft diet with good appetite. Had BM today after suppository given by анна RN. Voiding w/o difficulty. Pt voiding very frequently. Impulsive. Bed alarms on. Pt reminded to \"call; don't fall\". IV iron #1 out of 3 given today. Up independently. Pt lives alone. States his spouse passed away about a year and a half ago. States he still drives. Pt's children at bedside earlier today and updated with plan of care. Pt plans to be dc'd home when cleared.

## 2025-02-01 NOTE — PROGRESS NOTES
1053: New consult paged to Dr Dennis. Farideh HAJI on call. Awaiting response.     1153: Sx responded and will see.

## 2025-02-01 NOTE — ANESTHESIA POSTPROCEDURE EVALUATION
[facility]    Luc Slade Patient Status:  Observation   Age/Gender 89 year old male MRN [id number]   Location [facility] ENDOSCOPY [facility]IN [facility] Attending Gamaliel Aguirre DO   Hosp Day # 0 PCP Gini Breaux MD       Anesthesia Post-op Note    PUSH ENTEROSCOPY    Procedure Summary       Date: 02/01/25 Room / Location:  ENDOSCOPY 03 / [facility] ENDOSCOPY    Anesthesia Start: 1043 Anesthesia Stop: 1108    Procedure: PUSH ENTEROSCOPY Diagnosis:       Abdominal pain      (normal)    Surgeons: Lorenzo Aj MD Anesthesiologist: Mark Perez MD    Anesthesia Type: MAC ASA Status: 3            Anesthesia Type: MAC    Vitals Value Taken Time   /73 02/01/25 1120   Temp  02/01/25 1124   Pulse 59 02/01/25 1120   Resp 18 02/01/25 1120   SpO2 100 % 02/01/25 1120           Patient Location: PACU    Anesthesia Type: MAC    Airway Patency: patent    Postop Pain Control: adequate    Mental Status: mildly sedated but able to meaningfully participate in the post-anesthesia evaluation    Nausea/Vomiting: none    Cardiopulmonary/Hydration status: stable euvolemic    Complications: no apparent anesthesia related complications    Postop vital signs: stable    Dental Exam: Unchanged from Preop    Patient to be discharged from PACU when criteria met.

## 2025-02-01 NOTE — PROGRESS NOTES
[facility]   part of [facility]     Hospitalist Progress Note     Luc Slade Patient Status:  Observation    10/4/1932 MRN [id number]   Location [facility] 3SW-A Attending Gamaliel Aguirre,    Hosp Day # 0 PCP Gini Breaux MD     Chief Complaint: Abdominal pain/N/V    Subjective:     Patient seen and examined. Denies abdominal pain/N/V.     Objective:    Review of Systems:   A comprehensive review of systems was completed; pertinent positive and negatives stated in subjective.    Vital signs:  Temp:  [97.2 °F (36.2 °C)-98.1 °F (36.7 °C)] 98.1 °F (36.7 °C)  Pulse:  [57-87] 57  Resp:  [18-26] 18  BP: (126-170)/(60-85) 158/75  SpO2:  [88 %-100 %] 98 %    Physical Exam:    General: No acute distress  Respiratory: No wheezes, no rhonchi  Cardiovascular: S1, S2, regular rate and rhythm  Abdomen: Soft, Non-tender, non-distended, positive bowel sounds  Neuro: No new focal deficits.   Extremities: No edema    Diagnostic Data:    Labs:  Recent Labs   Lab 25  0017 25  0655   WBC 7.6 5.9   HGB 11.8* 10.4*   MCV 73.4* 72.1*   .0* 177.0   INR  --  1.14       Recent Labs   Lab 25  0017 25  0655   * 91   BUN 16 10   CREATSERUM 1.27 0.91   CA 10.6 9.4   ALB 4.6 3.8    144   K 4.2 4.0    109   CO2 33.0* 28.0   ALKPHO 61 49   AST 23 17   ALT 21 15   BILT 0.4 0.8   TP 7.8 6.1       Estimated Glomerular Filtration Rate: 79.1 mL/min/1.73m2 (by CKD-EPI based on SCr of 0.91 mg/dL).    No results for input(s): \"TROP\", \"TROPHS\", \"CK\" in the last 168 hours.    Recent Labs   Lab 25  0655   PTP 14.7   INR 1.14                  Microbiology    No results found for this visit on 25.      Imaging: Reviewed in Epic.    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 with resulting distention of stomach and duodenum  -S/p  EGD and push-enteroscopy 2/1 - no acute findings  -Trial of diet per GI  -Vascular surgery and general surgery to see as well    #Microcytic anemia  -IV iron x 3 dose    #Essential HTN  -Norvasc     Gamaliel Aguirre,     Supplementary Documentation:     Quality:  DVT Mechanical Prophylaxis:   SCDs, Early ambuation  DVT Pharmacologic Prophylaxis   Medication    enoxaparin (Lovenox) 40 MG/0.4ML SUBQ injection 40 mg                Code Status: Not on file  Medina: No urinary catheter in place  Medina Duration (in days):   Central line:    MACKENZIE:     Discharge is dependent on: clinical progress  At this point Mr. Slade is expected to be discharge to: home     The 21st Century Cures Act makes medical notes like these available to patients in the interest of transparency. Please be advised this is a medical document. Medical documents are intended to carry relevant information, facts as evident, and the clinical opinion of the practitioner. The medical note is intended as peer to peer communication and may appear blunt or direct. It is written in medical language and may contain abbreviations or verbiage that are unfamiliar.

## 2025-02-01 NOTE — ANESTHESIA PREPROCEDURE EVALUATION
PRE-OP EVALUATION    Patient Name: Luc Slade    Admit Diagnosis: Abdominal pain of unknown etiology [R10.9]    Pre-op Diagnosis: Abdominal pain [R10.9]    PUSH ENTEROSCOPY POSS. EGD    Anesthesia Procedure: PUSH ENTEROSCOPY POSS. EGD    Surgeons and Role:     * Lorenzo Aj MD - Primary    Pre-op vitals reviewed.  Temp: 97.9 °F (36.6 °C)  Pulse: 57  Resp: 22  BP: 161/60  SpO2: 99 %  Body mass index is 24.21 kg/m².    Current medications reviewed.  Hospital Medications:   [COMPLETED] ondansetron (Zofran) 4 MG/2ML injection 4 mg  4 mg Intravenous Once    [COMPLETED] iopamidol 76% (ISOVUE-370) injection for power injector  80 mL Intravenous ONCE PRN    acetaminophen (Tylenol Extra Strength) tab 1,000 mg  1,000 mg Oral Q8H PRN    melatonin tab 3 mg  3 mg Oral Nightly PRN    polyethylene glycol (PEG 3350) (Miralax) 17 g oral packet 17 g  17 g Oral Daily PRN    sennosides (Senokot) tab 17.2 mg  17.2 mg Oral Nightly PRN    bisacodyl (Dulcolax) 10 MG rectal suppository 10 mg  10 mg Rectal Daily PRN    ondansetron (Zofran) 4 MG/2ML injection 4 mg  4 mg Intravenous Q6H PRN    metoclopramide (Reglan) 5 mg/mL injection 5 mg  5 mg Intravenous Q8H PRN    benzonatate (Tessalon) cap 200 mg  200 mg Oral TID PRN    guaiFENesin (Robitussin) 100 MG/5 ML oral liquid 200 mg  200 mg Oral Q4H PRN    glycerin-hypromellose- (Artificial Tears) 0.2-0.2-1 % ophthalmic solution 1 drop  1 drop Both Eyes QID PRN    sodium chloride (Saline Mist) 0.65 % nasal solution 1 spray  1 spray Each Nare Q3H PRN    enoxaparin (Lovenox) 40 MG/0.4ML SUBQ injection 40 mg  40 mg Subcutaneous Daily    hydrALAzine (Apresoline) 20 mg/mL injection 10 mg  10 mg Intravenous Q6H PRN    amLODIPine (Norvasc) tab 5 mg  5 mg Oral Daily    lactated ringers infusion   Intravenous Continuous       Outpatient Medications:   Prescriptions Prior to Admission[1]    Allergies: Patient has no known allergies.      Anesthesia Evaluation    Patient summary  reviewed.    Anesthetic Complications  (-) history of anesthetic complications         GI/Hepatic/Renal  Comment: ulcer    (+) GERD                           Cardiovascular      ECG reviewed.  Exercise tolerance: good     MET: >4      (+) hypertension                                     Endo/Other               (+) anemia            (+) arthritis       Pulmonary    Negative pulmonary ROS.             (-) recent URI          Neuro/Psych    Negative neuro/psych ROS.                          Patient Active Problem List:     Personal history of prostate cancer     SMAS (superior mesenteric artery syndrome) (HCC)     Acute kidney injury     Abdominal pain of unknown etiology     Nausea and vomiting     Primary hypertension            Past Surgical History:   Procedure Laterality Date    Repair rotator cuff,acute      both shoulders     Social History     Socioeconomic History    Marital status:    Tobacco Use    Smoking status: Never    Smokeless tobacco: Never   Vaping Use    Vaping status: Never Used   Substance and Sexual Activity    Alcohol use: No    Drug use: No     History   Drug Use No     Available pre-op labs reviewed.  Lab Results   Component Value Date    WBC 5.9 02/01/2025    RBC 4.45 02/01/2025    HGB 10.4 (L) 02/01/2025    HCT 32.1 (L) 02/01/2025    MCV 72.1 (L) 02/01/2025    MCH 23.4 (L) 02/01/2025    MCHC 32.4 02/01/2025    RDW 15.2 02/01/2025    .0 02/01/2025     Lab Results   Component Value Date     02/01/2025    K 4.0 02/01/2025     02/01/2025    CO2 28.0 02/01/2025    BUN 10 02/01/2025    CREATSERUM 0.91 02/01/2025    GLU 91 02/01/2025    CA 9.4 02/01/2025     Lab Results   Component Value Date    INR 1.14 02/01/2025         Airway      Mallampati: II  Mouth opening: >3 FB  TM distance: > 6 cm  Neck ROM: full Cardiovascular      Rhythm: regular  Rate: normal     Dental             Pulmonary      Breath sounds clear to auscultation bilaterally.               Other findings               ASA: 3   Plan: MAC  NPO status verified and patient meets guidelines.    Post-procedure pain management plan discussed with surgeon and patient.      Plan/risks discussed with: patient                Present on Admission:   SMAS (superior mesenteric artery syndrome) (HCC)             [1]   Medications Prior to Admission   Medication Sig Dispense Refill Last Dose/Taking    ferrous sulfate 325 (65 FE) MG Oral Tab EC Take 1 tablet (325 mg total) by mouth every morning.   1/30/2025 Morning    amLODIPine besylate 5 MG Oral Tab Take 1 tablet (5 mg total) by mouth daily.   1/30/2025 Morning    docusate sodium 100 MG Oral Cap Take 1 capsule (100 mg total) by mouth every morning.   1/30/2025 Morning

## 2025-02-01 NOTE — PLAN OF CARE
Transfer from 2240-A. Skin check completed with LATRICIA Duke. Skin is clean, dry, and intact. Patient is alert and oriented x4. Room air. Continuous pulse oximeter. Telemetry monitoring. Non-cardiac electrolyte protocol. Last bowel movement 1/30. Vascular and GI consults placed. Hard of hearing with hearing aides at home. Ultrasound of abdominal region and EGD pending. NPO at midnight. IV fluids of LR at a rate of 100 to start at midnight. Plan is home when medically stable.

## 2025-02-01 NOTE — CONSULTS
Consultation Note        Luc Slade Patient Status:  Observation    10/4/1932 MRN [id number]   [facility] 3SW-A Attending Gamaliel Aguirre,    Hosp Day # 0 PCP Gini Breaux MD       Reason for Consultation   Abdominal pain, nausea/vomiting       History of Present Illness   Mr Slade is a 89 year old male with a history of PUD, HTN, and prostate cancer who presents with acute abdominal pain, nausea, and vomiting. He reports that he was feeling fine until the day of admission, where soon after eating he noticed epigastric pain, followed by nausea and non-bloody vomiting. Soon after, he felt fine, and he currently has no symptoms. He denies dysphagia, odynophagia, AC/NSAID use, and luminal GI surgeries. On admission, CT showed a fluid filled stomach and proximal duodenum, with possible compression by the SMA. There was distension of the bile duct, and his LFTs were normal.  His lipase was elevated to the 200s, however there were no pancreas abnormalities on the Ct. He presented similarly in , and had a push enteroscopy that showed gastric ulcers, but no bleeding or stricturing disease. He also had an unremarkable UGI as well.         PMH/MEDS/ALLERGIES/SH/FH:     Past Medical History:    Bleeding ulcer    Blood disorder    anemia    Essential hypertension    Exposure to medical diagnostic radiation    radioactive seed implant    Heart murmur    Nausea and vomiting    Osteoarthritis    back    Prostate CA (HCC)    Visual impairment    glasses      Past Surgical History:   Procedure Laterality Date    Repair rotator cuff,acute      both shoulders        No recently discontinued medications to reconcile   Medications Ordered Prior to Encounter[1]    Current Allergies: Allergies[2]    Social History     Occupational History    Not on file   Tobacco Use    Smoking status: Never    Smokeless tobacco: Never   Vaping Use    Vaping status: Never Used   Substance and Sexual Activity     Alcohol use: No    Drug use: No    Sexual activity: Not on file         History reviewed. No pertinent family history.           MEDICATIONS      [COMPLETED] ondansetron (Zofran) 4 MG/2ML injection 4 mg  4 mg Intravenous Once    [COMPLETED] iopamidol 76% (ISOVUE-370) injection for power injector  80 mL Intravenous ONCE PRN    acetaminophen (Tylenol Extra Strength) tab 1,000 mg  1,000 mg Oral Q8H PRN    melatonin tab 3 mg  3 mg Oral Nightly PRN    polyethylene glycol (PEG 3350) (Miralax) 17 g oral packet 17 g  17 g Oral Daily PRN    sennosides (Senokot) tab 17.2 mg  17.2 mg Oral Nightly PRN    bisacodyl (Dulcolax) 10 MG rectal suppository 10 mg  10 mg Rectal Daily PRN    ondansetron (Zofran) 4 MG/2ML injection 4 mg  4 mg Intravenous Q6H PRN    metoclopramide (Reglan) 5 mg/mL injection 5 mg  5 mg Intravenous Q8H PRN    benzonatate (Tessalon) cap 200 mg  200 mg Oral TID PRN    guaiFENesin (Robitussin) 100 MG/5 ML oral liquid 200 mg  200 mg Oral Q4H PRN    glycerin-hypromellose- (Artificial Tears) 0.2-0.2-1 % ophthalmic solution 1 drop  1 drop Both Eyes QID PRN    sodium chloride (Saline Mist) 0.65 % nasal solution 1 spray  1 spray Each Nare Q3H PRN    enoxaparin (Lovenox) 40 MG/0.4ML SUBQ injection 40 mg  40 mg Subcutaneous Daily    hydrALAzine (Apresoline) 20 mg/mL injection 10 mg  10 mg Intravenous Q6H PRN    amLODIPine (Norvasc) tab 5 mg  5 mg Oral Daily    [START ON 2/1/2025] lactated ringers infusion   Intravenous Continuous              ROS:     A comprehensive 14 point review of systems was completed.  Pertinent positives and negatives noted in the the HPI.          Physical Exam     Vital signs:  /61 (BP Location: Right arm)   Pulse 76   Temp 98 °F (36.7 °C) (Oral)   Resp 18   Ht 5' 6\" (1.676 m)   Wt 150 lb (68 kg)   SpO2 99%   BMI 24.21 kg/m²         Physical Exam        General: Appears alert, oriented x 3 and in no acute distress.  HEENT: Normal. No scleral icterus.   NECK: Supple. No  neck vein distention. Thyroid not enlarged.  CV: S1 and S2 normal. No murmurs or gallops.  LUNGS: Clear to percussion and auscultation.  ABDOMEN: Soft and non-distended. Non-tender. No masses. Bowel sounds are present.  BACK: No CVA tenderness.  EXTREMITIES: No edema, cyanosis or clubbing.  SKIN: Warm and dry.         IMAGING/LABS       Labs:   Lab Results   Component Value Date    WBC 7.6 01/31/2025    HGB 11.8 01/31/2025    HCT 37.2 01/31/2025    .0 01/31/2025    CREATSERUM 1.27 01/31/2025    BUN 16 01/31/2025     01/31/2025    K 4.2 01/31/2025     01/31/2025    CO2 33.0 01/31/2025     01/31/2025    CA 10.6 01/31/2025    ALB 4.6 01/31/2025    ALKPHO 61 01/31/2025    BILT 0.4 01/31/2025    AST 23 01/31/2025    ALT 21 01/31/2025     01/31/2025     Recent Labs   Lab 01/31/25  0017   *   BUN 16   CREATSERUM 1.27   CA 10.6      K 4.2      CO2 33.0*     Recent Labs   Lab 01/31/25  0017   RBC 5.07   HGB 11.8*   HCT 37.2*   MCV 73.4*   MCH 23.3*   MCHC 31.7   RDW 15.5   NEPRELIM 5.49   WBC 7.6   .0*       Recent Labs   Lab 01/31/25  0017   ALT 21   AST 23       Imaging:   CT ABDOMEN+PELVIS(CONTRAST ONLY)(CPT=74177)  Narrative: PROCEDURE:  CT ABDOMEN+PELVIS (CONTRAST ONLY) (CPT=74177)     COMPARISON:  [facility], CT, CT ABDOMEN+PELVIS(CONTRAST ONLY)(CPT=74177), 6/26/2024, 10:03 AM.     INDICATIONS:  NVD     TECHNIQUE:  CT scanning was performed from the dome of the diaphragm to the pubic symphysis with non-ionic intravenous contrast material. Post contrast coronal MPR imaging was performed.  Dose reduction techniques were used. Dose information is   transmitted to the ACR (American College of Radiology) NRDR (National Radiology Data Registry) which includes the Dose Index Registry.     PATIENT STATED HISTORY:(As transcribed by Technologist)  nausea, vomiting, diarrhea      CONTRAST USED:  80cc of Isovue 370     FINDINGS:    LIVER:  No enlargement,  atrophy, abnormal density, or significant focal lesion.    BILIARY:  There is moderate distention of the common bile duct.  This could be related to advanced age.  If there is clinically significant jaundice then further evaluation with MRI should be considered.  PANCREAS:  No lesion, fluid collection, ductal dilatation, or atrophy.    SPLEEN:  No enlargement or focal lesion.    KIDNEYS:  There is an extrarenal pelvis on the right.  This is a stable finding.  ADRENALS:  No mass or enlargement.    AORTA/VASCULAR:  Aorta is atherosclerotic but not aneurysmal.  RETROPERITONEUM:  No mass or adenopathy.    BOWEL/MESENTERY:  There is narrowing of the transverse part of the duodenum at the level of the superior mesenteric artery.  There is also distention of the stomach.  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  Clinical significance is uncertain.  Bowel is otherwise unremarkable.  ABDOMINAL WALL:  Moderate bilateral hydroceles are noted.  URINARY BLADDER:  No visible focal wall thickening, lesion, or calculus.    PELVIC NODES:  No adenopathy.    PELVIC ORGANS:  There are brachytherapy seeds in the prostate.  BONES:  There is degenerative change of facets and discs in the spine.  LUNG BASES:  No visible pulmonary or pleural disease.    OTHER:  Negative.                     Impression: CONCLUSION:    1. Fluid-filled stomach and proximal duodenum are noted to the level of compression by the SMA.  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  2. Distention of the bile duct is likely related to patient's age.  If there is clinically significant jaundice then further evaluation with MRI could be considered.  3. There are brachytherapy seeds in the prostate.  4. There is no other acute abnormality.     Preliminary report was reviewed and there is no significant  discrepancy.            LOCATION:  [facility]        Dictated by (CST): Yong Olson MD on 1/31/2025 at 6:38 AM       Finalized by (CST): Yong Olson MD on 1/31/2025 at 6:42 AM        2021 push enteroscopy: Findings and Therapeutics:  Esophagus:   The mucosa was normal.   There were no strictures or stenosis. GEJ junction traversed with endoscope without resistance.  The Z-line was irregular, appreciated at 40 cm from the incisors.    Stomach:    One gastric ulcer, with adherent clot, 7 mm in size in the gastric body.  This had stigmata of recent bleeding.  7 cc of epinephrine 1:10,000 were injected in 4 quadrants around the ulcer.  4 hemostatic clips were deployed with success.  Hemostasis was achieved at the end of the maneuver  One 5 mm gastric ulcer, clean base in the body.  No stigmata of recent bleeding from this ulcer.   There was no evidence of a hiatal hernia. Biopsies with cold forceps were not obtained in the setting of active bleeding  Duodenum:   Using enteroscopy, traversed the entire portion of the duodenum and likely to the first portion of the jejunum.  There was no evidence of stricturing disease or stenosis within the visualized small intestine.  Endoscope traversed through the entire visualized small intestine without any resistance.       IMPRESSION:   Mr Slade is a 89 year old male with a history of PUD, HTN, and prostate cancer who presents with acute abdominal pain, nausea, and vomiting that has resolved, with CT on admission showing a dilated stomach and proximal duodenum. Ddx include GOO or duodenal obstruction, PUD, SMA syndrome (less likely given his age and weight), gastroparesis, acute infectious gastroenteritis. His mildly distended CBD is likely due to age given his normal LFTs. His clinical picture is not consistent with pancreatitis, and his lipase elevation is likely due to the acute GI symptoms/condition he has.             PLAN:   -clear liquid diet, NPO at MN  -Push enteroscopy  with the assistance of MAC anesthesia for further evaluation on 2/1-the risks, benefits and alternatives were discussed, including the risk of the preparation, anesthesia and the risk of perforation and bleeding with the procedure itself.  The risks of not proceeding were also discussed, including the risks of missing lesions including polyps or masses. The patient expresses an understanding and agrees to proceed as planned.   -consider repeat upper GI on 2/1 if enteroscopy unremarkable  -consider MRI MRCP if symptoms return/persist to evaluate CBD  -vascular surgery consulted for SMA syndrome evaluation           Bc Richardson MD  6:29 PM  1/31/2025  [facility]  [phone]                  [1]   No current facility-administered medications on file prior to encounter.     Current Outpatient Medications on File Prior to Encounter   Medication Sig Dispense Refill    ferrous sulfate 325 (65 FE) MG Oral Tab EC Take 1 tablet (325 mg total) by mouth every morning.      amLODIPine besylate 5 MG Oral Tab Take 1 tablet (5 mg total) by mouth daily.      docusate sodium 100 MG Oral Cap Take 1 capsule (100 mg total) by mouth every morning.     [2] No Known Allergies

## 2025-02-01 NOTE — PROGRESS NOTES
1015: Pt sent to [facility], accompanied by his family members.    1132: Pt back from [facility] Soft diet ordered

## 2025-02-01 NOTE — CONSULTS
[facility]   part of [facility]    Vascular Surgery Consultation    Luc Slade Patient Status:  Observation    10/4/1932 MRN [id number]   Location [facility] 3SW-A Attending Gamaliel Aguirre,    Hosp Day # 0 PCP Gini Breaux MD     Date of Admission:  2025  Date of Consult: 2025    “I was requested by Dr. Aguirre to provide a consultation for Luc Slade  Reason for Consultation:   Concerns for SMA syndrome    History of Present Illness:   Patient is a 89 year old male with past medical history of HTN, prostate cancer and PUD who was admitted to the hospital for intermittent abdominal pain.  Patient has 2 to 3 years of intermittent abdominal pain that happens every few months.  The pain happens after meal but is not accompanying with any other change of bowel habit or nausea or vomiting.  According to patient's family, enjoys having large amount of green vegetables salads with each meal.  He is otherwise very healthy and active.  He is not a smoker.  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    Past Medical History  Past Medical History:    Bleeding ulcer    Blood disorder    anemia    Essential hypertension    Exposure to medical diagnostic radiation    radioactive seed implant    Heart murmur    Nausea and vomiting    Osteoarthritis    back    Prostate CA (HCC)    Visual impairment    glasses       Past Surgical History  Past Surgical History:   Procedure Laterality Date    Repair rotator cuff,acute      both shoulders       Family History  History reviewed. No pertinent family history.    Social History  Social History     Socioeconomic History    Marital status:    Tobacco Use    Smoking status: Never    Smokeless tobacco: Never   Vaping Use    Vaping status: Never Used   Substance and Sexual Activity    Alcohol use: No    Drug use: No     Social Drivers of Health     Food Insecurity: No Food Insecurity  (1/31/2025)    Food Insecurity     Food Insecurity: Never true   Transportation Needs: No Transportation Needs (1/31/2025)    Transportation Needs     Lack of Transportation: No    [facility]    Social Connections   Housing Stability: Low Risk  (1/31/2025)    Housing Stability     Housing Instability: No        Current Medications:  Current Facility-Administered Medications   Medication Dose Route Frequency    sodium ferric gluconate (Ferrlecit) 125 mg in sodium chloride 0.9% 100mL IVPB premix  125 mg Intravenous Daily    acetaminophen (Tylenol Extra Strength) tab 1,000 mg  1,000 mg Oral Q8H PRN    melatonin tab 3 mg  3 mg Oral Nightly PRN    polyethylene glycol (PEG 3350) (Miralax) 17 g oral packet 17 g  17 g Oral Daily PRN    sennosides (Senokot) tab 17.2 mg  17.2 mg Oral Nightly PRN    bisacodyl (Dulcolax) 10 MG rectal suppository 10 mg  10 mg Rectal Daily PRN    ondansetron (Zofran) 4 MG/2ML injection 4 mg  4 mg Intravenous Q6H PRN    metoclopramide (Reglan) 5 mg/mL injection 5 mg  5 mg Intravenous Q8H PRN    benzonatate (Tessalon) cap 200 mg  200 mg Oral TID PRN    guaiFENesin (Robitussin) 100 MG/5 ML oral liquid 200 mg  200 mg Oral Q4H PRN    glycerin-hypromellose- (Artificial Tears) 0.2-0.2-1 % ophthalmic solution 1 drop  1 drop Both Eyes QID PRN    sodium chloride (Saline Mist) 0.65 % nasal solution 1 spray  1 spray Each Nare Q3H PRN    enoxaparin (Lovenox) 40 MG/0.4ML SUBQ injection 40 mg  40 mg Subcutaneous Daily    hydrALAzine (Apresoline) 20 mg/mL injection 10 mg  10 mg Intravenous Q6H PRN    amLODIPine (Norvasc) tab 5 mg  5 mg Oral Daily     Medications Prior to Admission   Medication Sig    ferrous sulfate 325 (65 FE) MG Oral Tab EC Take 1 tablet (325 mg total) by mouth every morning.    amLODIPine besylate 5 MG Oral Tab Take 1 tablet (5 mg total) by mouth daily.    docusate sodium 100 MG Oral Cap Take 1 capsule (100 mg total) by  mouth every morning.       Allergies  Allergies[1]    Review of Systems:   Constitutional: No fevers, chills, fatigue or night sweats.  ENT: No neck pain, running nose, headaches.  Skin: No rashes, pruritus or skin changes,  Respiratory: No coughing, phlegm or wheezing.  CV: Denies chest pain, palpitations, orthopnea, or dizziness.  Musculoskeletal: No joint pain, stiffness or swelling.  GI: Intermittent abdominal pain with no nausea or vomiting.  No recent weight loss no food fear.  No postprandial abdominal pain.   Neurologic: No seizures, tremors, weakness or numbness.    Physical Exam:   Blood pressure 158/75, pulse 57, temperature 98.1 °F (36.7 °C), temperature source Oral, resp. rate 18, height 5' 6\" (1.676 m), weight 150 lb (68 kg), SpO2 98%.  Intake/Output:   Last 3 shifts: I/O last 3 completed shifts:  In: 1101 [P.O.:540; I.V.:561]  Out: 626 [Urine:626]   This shift: I/O this shift:  In: 1213 [P.O.:360; I.V.:853]  Out: -      Vent Settings:      General: NAD  Neck: No JVD  Lungs: CTA bilat  Heart: RRR  Abdomen: Soft, no tenderness or sign of peritonitis.  No rigidity or self guarding.    Extremities: Warm, dry, no LE edema bilat  Vascular: B Femoral pulses  Skin: no rashes or legions noted  Neurological:  AAOx3, MAEW    Hemodynamic parameters (last 24 hours):      Medications:     Scheduled Meds:    sodium ferric gluconate  125 mg Intravenous Daily    enoxaparin  40 mg Subcutaneous Daily    amLODIPine  5 mg Oral Daily         Results:     Laboratory Data:  Lab Results   Component Value Date    WBC 5.9 02/01/2025    HGB 10.4 (L) 02/01/2025    HCT 32.1 (L) 02/01/2025    .0 02/01/2025    CREATSERUM 0.91 02/01/2025    BUN 10 02/01/2025     02/01/2025    K 4.0 02/01/2025     02/01/2025    CO2 28.0 02/01/2025    GLU 91 02/01/2025    CA 9.4 02/01/2025    ALB 3.8 02/01/2025    ALKPHO 49 02/01/2025    TP 6.1 02/01/2025    AST 17 02/01/2025    ALT 15 02/01/2025    INR 1.14 02/01/2025    PTP 14.7  02/01/2025     (H) 01/31/2025    PSA <0.13 06/28/2018    MG 2.0 02/01/2025    PHOS 3.0 02/01/2025    TROPHS 11 05/24/2023         Imaging:  US ABDOMEN COMPLETE WITH DOPPLER(CPT=76700/69133)  Narrative: PROCEDURE:  US ABDOMEN COMPLETE WITH DOPPLER(CPT=76700/98742)     COMPARISON:  [facility] , CT, CT ABDOMEN+PELVIS(CONTRAST ONLY)(CPT=74177), 1/31/2025, 2:30 AM.  [facility] , CT, CT ABD(C/S) / PELVIS(C/S) -SET, 8/02/2010, 3:21 PM.     INDICATIONS:  Possible compression of the SMA abnormal CT finding.     TECHNIQUE:  Real time gray-scale ultrasound was used to evaluate the abdomen.  B-mode images, Doppler color flow, and spectral waveform analysis were performed of the portal vein, hepatic artery, hepatic vein, and splenic vein.  The exam includes images   of the liver, gallbladder, common bile duct, pancreas, spleen, kidneys, IVC, and aorta.            FINDINGS:     PEAK VELOCITIES (cm/sec):  AORTA ABOVE CELIAC  45  AORTA above renal:  86     Aorta:  83  Celiac origin  160  Celiac insp  204  Celiac exp  192  SMA org  160  SMA prox  152  SMA mid  116  SMA dis  82  DORA org  130  DORA prox  119                       Impression: CONCLUSION:       No evidence of arterial stenosis.        LOCATION:  [facility]        Dictated by (CST): Israel Desir MD on 2/01/2025 at 8:55 AM       Finalized by (CST): Israel Desir MD on 2/01/2025 at 9:21 AM            Impression:   89 year old male with past medical history of HTN, prostate cancer and PUD who was admitted to the hospital for intermittent abdominal pain.  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  GI specialist evaluated patient this morning with the postendoscopy which evaluated the distal stomach and proximal duodenum with no signs of obstruction related to peptic ulcer disease    Recommendations:  -No vascular surgery intervention is indicated at this time.  I had a long conversation with patient's family explaining  that surgical intervention for partial bowel obstruction related to compression of superior mesenteric artery is mostly managed conservatively unless all of the conservative managements fail.  - I think the best way to manage this is to reduce patient's each meal portion and add to the frequency of meals per day.  I recommended reducing the amount of green vegetables he eats with each meal.  - I explained at the end of the day surgical treatment of SMA syndrome is not within the fields of vascular surgeon.  Patient requires gastrojejunostomy if all of the conservative management fail.  I recommended general surgery consult for evaluation of the patient and recommendations.  - Please call with other questions or concerns.    Thank you for allowing me to participate in the care of your patient.    Davina Oliva MD  2/1/2025  [facility], Division of Vascular Surgery    Please note: Dragon speech recognition software was used to prepare this note. If a word or phrase is confusing, it is likely do to a failure of recognition.   Please contact me with any questions or clarifications.       [1] No Known Allergies

## 2025-02-01 NOTE — OPERATIVE REPORT
Operative Report-Esophagogastroduodenoscopy with Push Enteroscopy      PREOPERATIVE DIAGNOSIS/INDICATION:  Nausea vomiting  Abnormal CT with dilated stomach and duodenum  POSTOPERTATIVE DIAGNOSIS:  Normal push enteroscopy to distal duodenum  PROCEDURE PERFORMED: Push Enteroscopy  INFORMED CONSENT: Once a brief history and physical examination was performed, the risks, benefits and alternatives to the procedure were discussed with the patient and/or family and informed consent was obtained.  The risks of sedation, perforation, missed lesions and need for surgery were all discussed.  Patient expressed understanding of the risks and agreed to proceed.    PROCEDURE DESCRIPTION:  The patient was then brought to the endoscopy suite where his/her pulse, pulse oximetry and blood pressure were monitored. He/she was placed in the left lateral decubitus position and deep sedation was administered. Once adequate sedation was achieved, a bite block was placed and a lubricated tip of an Olympus video pediatric colonoscope was inserted through the oropharynx and gently manipulated through the esophagus into the stomach and the distal duodenum. Upon withdrawal of the endoscope, careful visualization of the mucosa was performed.   FINDINGS:  ESOPHAGUS:Normal in course and caliber, no mucosal erosions, ulcers  EGJ: Located at 38 cm otherwise normal  STOMACH: Normal gastric mucosa throughout  without ulcers, masses, polyps. Gastric insufflation normal with normal rugal pattern.   DUODENUM: Pediatric scope was advanced to 100 cm, no masses, polyps, avms seen  None  RECOMMENDATIONS:   Post EGD precautions, watch for bleeding, infection, perforation, adverse drug reactions   Discussed case with Vascular surgeon. Had a similar episode in 2021 with normal endoscopy, UGI.  He has been asymptomatic in the interim. Could be transient small bowel obstruction that's self-limited, possibly due to vascular  compression  Family reports that patients eats a high fiber diet, which may lend to the obstructive process  Recommend conservative measures with small frequent meals, less fiber rich foods  He is also iron deficient.  Colonoscopy many years ago. Discussed possibility of occult malignancy with family, they wish to defer further work up for this  Since he is of [place], will recommend IV iron prior to discharge  Advance diet to low residue  Possible dc in am if tolerating diet  .  CC Report:     Lorenzo Aj MD  2/1/2025  11:05 AM  Gini Breaux MD

## 2025-02-01 NOTE — PROGRESS NOTES
1103: Pt's family here and stated they spoke to Dr Breaux this AM and she had mentioned to them about pt's lab results and was wanting him to have IV iron while inpt. Informed family that I cannot take orders from her, but that I would pass it on to Dr Aguirre. [place] sent to Bong Aguirre. Awaiting response.    1126: IV ferrlicit ordered.

## 2025-02-01 NOTE — PLAN OF CARE
At this time denies abdm pain nauea or vomiting. Pt feels like he needs to have a bm, has tried x3 times this evening, only passing gas no bm yet, pt requesting laxatives this evening. He is currently on a CLD and will be npo at midnight for -Push enteroscopy tomorrow & UA abdm Doppler for eval of SMA syndrome eval - vascular surgery consulted by day team, CT A/P w preliminary findings of dilatation of the stomach and duodenum w transition point at the transverse duodenum concerning for possible compression by the SMA., on call gi md updated regarding all above, awaiting call back.  Per on call gi md, recommending giving pt rectal suppository tonight, see mar.    Pt tolerating cld. Vss at this time. Pt denies feeling bloated, or tenderness upon palpation to abdm. Voiding freely in bathroom. Up with sb ast, no walker or gait belt as pt refusing. Bed alarm in place, call light within reach. Pt very hard of hearing.      0100 bm this morning, now npo for us abdm & egd, ivf infusing. Consent signed & in pts ppr chart.    0634 per US will call for pt shortly, aware of egd planned for 0900 this am.    0700 pt sl, underclothes & watch taken off in pts belonging bags sent down to US, EGD rn updated.

## 2025-02-02 VITALS
BODY MASS INDEX: 24.11 KG/M2 | HEART RATE: 69 BPM | SYSTOLIC BLOOD PRESSURE: 113 MMHG | WEIGHT: 150 LBS | RESPIRATION RATE: 16 BRPM | HEIGHT: 66 IN | TEMPERATURE: 98 F | DIASTOLIC BLOOD PRESSURE: 52 MMHG | OXYGEN SATURATION: 96 %

## 2025-02-02 PROCEDURE — 99239 HOSP IP/OBS DSCHRG MGMT >30: CPT | Performed by: HOSPITALIST

## 2025-02-02 PROCEDURE — 99231 SBSQ HOSP IP/OBS SF/LOW 25: CPT | Performed by: PHYSICIAN ASSISTANT

## 2025-02-02 NOTE — DISCHARGE INSTRUCTIONS
reduce patient's each meal portion and add to the frequency of meals per day. I recommended reducing the amount of green vegetables he eats with each meal. - vascular DR  Recommend conservative measures with small frequent meals, less fiber rich foods - per your GI

## 2025-02-02 NOTE — PROGRESS NOTES
[facility]  Progress Note    Luc Slade Patient Status:  Observation    10/4/1932 MRN [id number]   Location [facility] 3SW-A Attending Gamaliel Aguirre, DO   Hosp Day # 0 PCP Gini Breaux MD     Subjective:  Patient is resting comfortably in bed and without acute complaints.  He denies any abdominal pain today.  He is tolerating a low residue diet without any complaints of nausea, vomiting, or abdominal pain.  He had 2 bowel movements and is passing flatus.  No fevers or chills.  Patient states he is ready to go home.    Objective/Physical Exam:  General: Alert, orientated x3.  Cooperative.  No apparent distress.  Vital Signs:  Blood pressure 139/70, pulse 58, temperature 98 °F (36.7 °C), temperature source Oral, resp. rate 18, height 66\", weight 150 lb (68 kg), SpO2 96%.  Lungs: No respiratory distress.  Cardiac: Regular rate and rhythm.   Abdomen:  Soft, non distended, non tender, with no rebound or guarding.  No peritoneal signs.   Extremities:  No lower extremity edema noted.      Labs:        Lab Results   Component Value Date    INR 1.14 2025       I/O last 3 completed shifts:  In: 2014 [P.O.:600; I.V.:1414]  Out: 1 [Urine:1]  I/O this shift:  In: 240 [P.O.:240]  Out: -     Assessment  Patient Active Problem List   Diagnosis    Personal history of prostate cancer    SMAS (superior mesenteric artery syndrome) (HCC)    Acute kidney injury    Abdominal pain of unknown etiology    Nausea and vomiting    Primary hypertension       Question of small bowel obstruction secondary to SMA syndrome    Plan:  Patient was seen by Dr. Pollack in consult yesterday -consult note pending  No clinical concern for small bowel obstruction or vascular obstruction  No plans for urgent or emergent surgical intervention  He may continue with a low fiber diet from our standpoint  Continue medical care per primary and consultants  General Surgery will sign off at this time.  Please call or reconsult us as  needed.      Mabel Good PA-C  2/2/2025  11:26 AM

## 2025-02-02 NOTE — DISCHARGE SUMMARY
[facility]     Luc Slade Patient Status:  Observation    10/4/1932 MRN [id number]   Location [facility] 3SW-A Attending Gamaliel Aguirre, DO   Hosp Day # 0 PCP Gini Breaux MD     Date of Admission: 2025  Date of Discharge:   2025    Discharge Disposition: Home    Discharge Diagnosis:  Intractable abdominal pain/N/V  Abnormal CT A/P with concern for SMA syndrome with resulting distention of stomach and duodenum  Microcytic anemia  Essential HTN    History of Present Illness: Patient notes he was in normal state of health up until earlier today where after he had a large meal states that he had onset of epigastric abdominal pain with associated nausea and small amount of vomiting. States nausea continued until he presented to the ER and after admission now feels abdominal pain and nausea fully resolved. Nevers had similar symptoms before. Denies any sick contacts, fevers, chills, chest pain, shortness of breath, diarrhea.     Brief Synopsis:     #Intractable abdominal pain/N/V  #Abnormal CT A/P with concern for SMA syndrome with resulting distention of stomach and duodenum  -S/p  EGD and push-enteroscopy  - no acute findings per GI  -Tolerating PO  -No further workup/intervention per Vascular surgery and general surgery      #Microcytic anemia  -IV iron x 2 doses given      #Essential HTN  -Norvasc     #Disposition  -Stable for DC home    Lace+ Score: 64  59-90 High Risk  29-58 Medium Risk  0-28   Low Risk       TCM Follow-Up Recommendation:  LACE > 58: High Risk of readmission after discharge from the hospital.      Procedures during hospitalization:   Push enteroscopy    Incidental or significant findings and recommendations (brief descriptions):  None    Lab/Test results pending at Discharge:   None    Consultants:  GI  General surgery  Vascular surgery    Discharge Medication List:     Discharge Medications        CONTINUE taking these medications         Instructions Prescription details   amLODIPine 5 MG Tabs  Commonly known as: Norvasc      Take 1 tablet (5 mg total) by mouth daily.   Refills: 0     docusate sodium 100 MG Caps  Commonly known as: Colace      Take 1 capsule (100 mg total) by mouth every morning.   Refills: 0     ferrous sulfate 325 (65 FE) MG Tbec      Take 1 tablet (325 mg total) by mouth every morning.   Refills: 0              [place]    Follow-up appointment:   Davina Oliva MD  [address] [place] [id number]  [phone]    Follow up  As needed    Appointments for Next 30 Days 2025 - 3/4/2025      None            Vital signs:  Temp:  [97.7 °F (36.5 °C)-98.1 °F (36.7 °C)] 97.7 °F (36.5 °C)  Pulse:  [58-95] 69  Resp:  [16-22] 16  BP: (113-155)/(52-79) 113/52  SpO2:  [93 %-98 %] 96 %    Physical Exam:    General: No acute distress   Lungs: clear to auscultation  Cardiovascular: S1, S2  Abdomen: Soft    -----------------------------------------------------------------------------------------------  PATIENT DISCHARGE INSTRUCTIONS: See electronic chart    Gamaliel Aguirre DO    Total time spent on discharge plannin minutes     The  [employer] Cures Act makes medical notes like these available to patients in the interest of transparency. Please be advised this is a medical document. Medical documents are intended to carry relevant information, facts as evident, and the clinical opinion of the practitioner. The medical note is intended as peer to peer communication and may appear blunt or direct. It is written in medical language and may contain abbreviations or verbiage that are unfamiliar.

## 2025-02-02 NOTE — CM/SW NOTE
02/02/25 0900   CM/SW Referral Data   Referral Source Social Work (self-referral)   Reason for Referral Discharge planning   Informant EMR;Clinical Staff Member   Patient Info   Patient's Current Mental Status at Time of Assessment Alert;Oriented   Patient lives with Alone   Patient Status Prior to Admission   Independent with ADLs and Mobility Yes   Discharge Needs   Anticipated D/C needs No anticipated discharge needs       Order received for HHC services. Chart reviewed.  Pt is a 89 y/o man admitted due to abdominal pain and nausea/vomiting.  He is now s/p EGD.  Pt normally active and independent.  No identified need for HHC services at this time. Contacted RN who also identified no concerns at this time.  / to remain available for support and/or discharge planning.     Emilee Richardson, [facility]  Discharge Planner  [phone]

## 2025-02-02 NOTE — PLAN OF CARE
Pt tolerating diet. Vss at this time. Pt denies feeling bloated, or tenderness upon palpation to abdm. Denies pain or nausea at this time. Voiding freely in bathroom. Up at jose martin. call light within reach. Pt very hard of hearing.

## 2025-02-02 NOTE — CONSULTS
[facility]  Report of Consultation    Luc Slade Patient Status:  Observation    10/4/1932 MRN [id number]   Location [facility] 3SW-A Attending Gamaliel Aguirre,    Hosp Day # 0 PCP Gini Breaux MD       This patient was referred by Gini Breaux MD for evaluation and consultation for ***.    Reason for Consultation:  ***    History of Present Illness:  Luc Slade is a a(n) 89 year old male. ***    History:  Past Medical History:    Bleeding ulcer    Blood disorder    anemia    Essential hypertension    Exposure to medical diagnostic radiation    radioactive seed implant    Heart murmur    Nausea and vomiting    Osteoarthritis    back    Prostate CA (HCC)    Visual impairment    glasses     Past Surgical History:   Procedure Laterality Date    Repair rotator cuff,acute      both shoulders     History reviewed. No pertinent family history.   reports that he has never smoked. He has never used smokeless tobacco. He reports that he does not drink alcohol and does not use drugs.    Allergies:  Allergies[1]    Medications:    Current Facility-Administered Medications:     sodium ferric gluconate (Ferrlecit) 125 mg in sodium chloride 0.9% 100mL IVPB premix, 125 mg, Intravenous, Daily    acetaminophen (Tylenol Extra Strength) tab 1,000 mg, 1,000 mg, Oral, Q8H PRN    melatonin tab 3 mg, 3 mg, Oral, Nightly PRN    polyethylene glycol (PEG 3350) (Miralax) 17 g oral packet 17 g, 17 g, Oral, Daily PRN    sennosides (Senokot) tab 17.2 mg, 17.2 mg, Oral, Nightly PRN    bisacodyl (Dulcolax) 10 MG rectal suppository 10 mg, 10 mg, Rectal, Daily PRN    ondansetron (Zofran) 4 MG/2ML injection 4 mg, 4 mg, Intravenous, Q6H PRN    metoclopramide (Reglan) 5 mg/mL injection 5 mg, 5 mg, Intravenous, Q8H PRN    benzonatate (Tessalon) cap 200 mg, 200 mg, Oral, TID PRN    guaiFENesin (Robitussin) 100 MG/5 ML oral liquid 200 mg, 200 mg, Oral, Q4H PRN    glycerin-hypromellose- (Artificial Tears)  0.2-0.2-1 % ophthalmic solution 1 drop, 1 drop, Both Eyes, QID PRN    sodium chloride (Saline Mist) 0.65 % nasal solution 1 spray, 1 spray, Each Nare, Q3H PRN    enoxaparin (Lovenox) 40 MG/0.4ML SUBQ injection 40 mg, 40 mg, Subcutaneous, Daily    hydrALAzine (Apresoline) 20 mg/mL injection 10 mg, 10 mg, Intravenous, Q6H PRN    amLODIPine (Norvasc) tab 5 mg, 5 mg, Oral, Daily    Review of Systems:  {Review Of Systems:02537}  A comprehensive 10 point review of systems was completed.  Pertinent positives and negatives noted in the the HPI.    Physical Exam:  Blood pressure 113/52, pulse 69, temperature 97.7 °F (36.5 °C), temperature source Oral, resp. rate 16, height 66\", weight 150 lb (68 kg), SpO2 96%.    General: Alert, orientated x3.  Cooperative.  No apparent distress.  HEENT: Exam is unremarkable.  Without scleral icterus.  Mucous membranes are moist.   Oropharynx is clear.  Neck: No tenderness to palpitation.    No JVD. Supple.   Lungs: Clear to auscultation bilaterally.  Cardiac: Regular rate and rhythm.   Abdomen:  Soft, non-distended, non-tender, with no rebound or guarding.  No peritoneal signs. No ascites.  Liver is within normal limits.  Spleen is not palpable.    Extremities:  No lower extremity edema noted.  Without clubbing or cyanosis.    Neurologic: Cranial nerves are grossly intact.  Motor strength and sensory examination is grossly normal.  No focal neurologic deficit.    Laboratory Data:  ***    Impression and Plan:  Patient Active Problem List   Diagnosis    Personal history of prostate cancer    SMAS (superior mesenteric artery syndrome) (HCC)    Acute kidney injury    Abdominal pain of unknown etiology    Nausea and vomiting    Primary hypertension       ***      Sunny Pollack MD  2/2/2025  2:21 PM         [1] No Known Allergies     Motor strength and sensory examination is grossly normal.  No focal neurologic deficit.    Imaging:  CT of the abdomen pelvis from January 31, 2025 was personally reviewed.  There is evidence of a distended stomach and small bowel with fluid contents, there is no solid components to suggest phytobezoar.  There is narrowing of the lumen and distal decompression at the level of the SMA.    Impression and Plan:  Patient Active Problem List   Diagnosis    Personal history of prostate cancer    SMAS (superior mesenteric artery syndrome) (HCC)    Acute kidney injury    Abdominal pain of unknown etiology    Nausea and vomiting    Primary hypertension       Patient with imaging consistent with SMA syndrome, but a history that contradicts it.  He has had stable weight and has no difficulty eating regularly.  As such, clinical mentation is discordant with SMA syndrome.  I would not offer this patient a strong's procedure or duodenojejunostomy bypass as I believe origin of his symptoms is not SMA syndrome.    Sunny Pollack MD  2/2/2025  2:21 PM         [1] No Known Allergies

## 2025-02-02 NOTE — PLAN OF CARE
Pt A&O. On room air. Hard of hearing w/ bilateral H/A at home. Tele and  monitoring maintained. Refusing Scds. Tolerating soft diet with good appetite. Had BM yesterday x2. Voiding w/o difficulty. Pt voiding very frequently. Pt reminded to \"call; don't fall\". Denies pain. Ambulating in halls frequently. IV iron #2 of 3 given. States he still drives. Pt's children at bedside earlier today and updated with plan of care. Pt ready to be dc'd home.

## 2025-02-02 NOTE — PROGRESS NOTES
Inpatient Follow up Note    Luc Slade Patient Status:  Observation    10/4/1932 MRN [id number]   Location [facility] 3SW-A Attending Gamaliel Aguirre,    Hosp Day # 0 PCP Gini Breaux MD     Reason for Consultation   Abdominal pain  Small bowel obstruction     Subjective     -Feels well today, eating full tray of meals, no abd pain  -+flatus, BM           Objective:     /70 (BP Location: Left arm)   Pulse 58   Temp 98 °F (36.7 °C) (Oral)   Resp 18   Ht 5' 6\" (1.676 m)   Wt 150 lb (68 kg)   SpO2 96%   BMI 24.21 kg/m²   Gen: AAOx2  CV: RRR with normal S1 / S2  Resp: CTA bilaterally  Abd: (+)BS, soft, non-tender, non-distended; no rebound or guarding  Ext: No edema or cyanosis  Skin: Warm and dry     Labs/Imaging     LABRCNTIP[PGLU:5@  Recent Labs   Lab 25  0655   INR 1.14         Recent Labs   Lab 25  0017 25  0655   WBC 7.6 5.9   HGB 11.8* 10.4*   .0* 177.0       Recent Labs   Lab 25  0017 25  0655    144   K 4.2 4.0    109   CO2 33.0* 28.0   BUN 16 10   CREATSERUM 1.27 0.91       Recent Labs   Lab 25  0017 25  0655   ALT 21 15   AST 23 17     US ABDOMEN COMPLETE WITH DOPPLER(CPT=76700/83312)    Result Date: 2025  CONCLUSION:   No evidence of arterial stenosis.   LOCATION:  [place]   Dictated by (CST): Israel Desir MD on 2025 at 8:55 AM     Finalized by (CST): Israel Desir MD on 2025 at 9:21 AM       CT ABDOMEN+PELVIS(CONTRAST ONLY)(CPT=74177)    Result Date: 2025  CONCLUSION:  1. Fluid-filled stomach and proximal duodenum are noted to the level of compression by the SMA.  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 2. Distention of the bile duct is likely related to patient's age.  If there is clinically significant jaundice then further evaluation with MRI could be considered. 3. There are brachytherapy  seeds in the prostate. 4. There is no other acute abnormality.  Preliminary report was reviewed and there is no significant discrepancy.     LOCATION:  [facility]   Dictated by (CST): Yong Olson MD on 1/31/2025 at 6:38 AM     Finalized by (CST): Yong Olson MD on 1/31/2025 at 6:42 AM      AST   Date/Time Value Ref Range Status   02/01/2025 06:55 AM 17 <34 U/L Final     ALT   Date/Time Value Ref Range Status   02/01/2025 06:55 AM 15 10 - 49 U/L Final     BUN   Date/Time Value Ref Range Status   02/01/2025 06:55 AM 10 9 - 23 mg/dL Final              Assessment     89 y/o male admitted with recurrent abd pain, gastroduodenal dilation, question of small bowel obstruction due to SmA syndrome? Enteroscopy normal, prior work up in 2021 with UGI normal, Symptoms completely resolved  -    Transient obstruction due to phytobezoar from diet? Doubt vascular obstruction, patient is higher risk of surgery given age, Gnosticism    2. Anemia with iron deficiency-of unclear source, Family wishes to defer further workup           Plan       Favor conservative measures, reduce amount of fiber with meals  IV iron infusion  OK for dc home from GI standpoint with close outpatient follow up         Lorenzo Aj MD  10:11 AM  2/2/2025  [facility]  [phone]

## 2025-02-25 ENCOUNTER — WALK IN (OUTPATIENT)
Dept: URGENT CARE | Age: OVER 89
End: 2025-02-25

## 2025-02-25 VITALS
DIASTOLIC BLOOD PRESSURE: 56 MMHG | OXYGEN SATURATION: 98 % | HEART RATE: 69 BPM | RESPIRATION RATE: 20 BRPM | TEMPERATURE: 99.5 F | SYSTOLIC BLOOD PRESSURE: 122 MMHG

## 2025-02-25 DIAGNOSIS — Z20.822 SUSPECTED COVID-19 VIRUS INFECTION: Primary | ICD-10-CM

## 2025-02-25 DIAGNOSIS — U07.1 COVID-19 VIRUS INFECTION: ICD-10-CM

## 2025-02-25 RX ORDER — BENZONATATE 200 MG/1
200 CAPSULE ORAL 3 TIMES DAILY PRN
Qty: 20 CAPSULE | Refills: 0 | Status: SHIPPED | OUTPATIENT
Start: 2025-02-25

## 2025-02-25 RX ORDER — PSEUDOEPHEDRINE HCL 30 MG
100 TABLET ORAL
COMMUNITY

## 2025-02-25 RX ORDER — FERROUS SULFATE 325(65) MG
325 TABLET, DELAYED RELEASE (ENTERIC COATED) ORAL
COMMUNITY

## 2025-02-25 RX ORDER — AMLODIPINE BESYLATE 5 MG/1
5 TABLET ORAL DAILY
COMMUNITY
Start: 2025-02-12

## 2025-02-25 ASSESSMENT — ENCOUNTER SYMPTOMS: COUGH: 1

## 2025-05-26 ENCOUNTER — APPOINTMENT (OUTPATIENT)
Dept: GENERAL RADIOLOGY | Facility: HOSPITAL | Age: OVER 89
End: 2025-05-26
Payer: OTHER GOVERNMENT

## 2025-05-26 ENCOUNTER — HOSPITAL ENCOUNTER (EMERGENCY)
Facility: HOSPITAL | Age: OVER 89
Discharge: HOME | End: 2025-05-26
Attending: EMERGENCY MEDICINE
Payer: OTHER GOVERNMENT

## 2025-05-26 VITALS
DIASTOLIC BLOOD PRESSURE: 64 MMHG | BODY MASS INDEX: 24.11 KG/M2 | HEIGHT: 66 IN | SYSTOLIC BLOOD PRESSURE: 135 MMHG | HEART RATE: 69 BPM | OXYGEN SATURATION: 95 % | WEIGHT: 150 LBS | RESPIRATION RATE: 16 BRPM | TEMPERATURE: 98 F

## 2025-05-26 DIAGNOSIS — M17.12 OSTEOARTHRITIS OF LEFT KNEE, UNSPECIFIED OSTEOARTHRITIS TYPE: Primary | ICD-10-CM

## 2025-05-26 PROCEDURE — 73562 X-RAY EXAM OF KNEE 3: CPT | Performed by: EMERGENCY MEDICINE

## 2025-05-26 PROCEDURE — 99284 EMERGENCY DEPT VISIT MOD MDM: CPT

## 2025-05-26 PROCEDURE — 99283 EMERGENCY DEPT VISIT LOW MDM: CPT

## 2025-05-26 RX ORDER — ACETAMINOPHEN 500 MG
1000 TABLET ORAL EVERY 6 HOURS PRN
Qty: 20 TABLET | Refills: 0 | Status: SHIPPED | OUTPATIENT
Start: 2025-05-26 | End: 2025-06-02

## 2025-05-26 NOTE — ED PROVIDER NOTES
Patient Seen in: [facility] Emergency Department        History  Chief Complaint   Patient presents with    Knee Pain     Stated Complaint: left knee pain, no trauma    Subjective:   HPI      89-year-old male presents to ED with left knee pain.  He states that pain was insidious, denies any injury nor trauma.  He states that he walks at least a mile a day.  He states that the pain is in generalized location around the knee.  Denies pain in the hip nor calf.    Objective:     Past Medical History:    Bleeding ulcer    Blood disorder    anemia    Essential hypertension    Exposure to medical diagnostic radiation    radioactive seed implant    Heart murmur    Nausea and vomiting    Osteoarthritis    back    Prostate CA (HCC)    Visual impairment    glasses              Past Surgical History:   Procedure Laterality Date    Repair rotator cuff,acute      both shoulders                Social History     Socioeconomic History    Marital status:    Tobacco Use    Smoking status: Never    Smokeless tobacco: Never   Vaping Use    Vaping status: Never Used   Substance and Sexual Activity    Alcohol use: No    Drug use: No     Social Drivers of Health     Food Insecurity: No Food Insecurity (1/31/2025)    Food Insecurity     Food Insecurity: Never true   Transportation Needs: No Transportation Needs (1/31/2025)    Transportation Needs     Lack of Transportation: No   Housing Stability: Low Risk  (1/31/2025)    Housing Stability     Housing Instability: No                                Physical Exam    ED Triage Vitals [05/26/25 1409]   /65   Pulse 69   Resp 16   Temp 97.9 °F (36.6 °C)   Temp src Temporal   SpO2 95 %   O2 Device None (Room air)       Current Vitals:   No data recorded          Physical Exam  Left knee:        ED Course  Labs Reviewed - No data to display       XR KNEE (3 VIEWS), LEFT (CPT=73562)  Result Date: 5/26/2025  PROCEDURE:  XR KNEE ROUTINE (3 VIEWS), LEFT (CPT=73562)  TECHNIQUE:   Three views were obtained including patellar view.  COMPARISON:  None.  INDICATIONS:  PATIENT STATED HISTORY: (As transcribed by Technologist)  Patient states he has left knee pain. States he walks 5-6 blocks every few days. No known trauma.     FINDINGS:  No acute fractures or osseous lesions.  Patellar femoral relationship is within normal limits.  Small  joint effusion.  Osteoarthritic changes.  Vascular calcifications.  Chondrocalcinosis.            CONCLUSION:  1. No acute fractures. 2. Osteoarthritic changes. 3. Small suprapatellar joint effusion.   LOCATION:  [facility]   Dictated by (CST): Christina Cespedes MD on 2025 at 2:47 PM     Finalized by (CST): Christina Cespedes MD on 2025 at 2:48 PM                         MDM     Medical Decision Making:    Differential diagnosis before testing includes arthritis, meniscal tear potential life threatening diagnosis which is a medical condition that poses a threat to life/function.     Comorbidities that add complexity to management: Age    I reviewed prior external ED notes including reviewed note from [facility] 3/25/2025 history of osteoarthritis    I personally reviewed the radiographs and my independent interpretation includes left knee osteoarthritis    Shared decision makin-year-old male insidious onset left knee pain x-ray with osteoarthritis.  Show patient is x-ray and discussed with him that he could try physical therapy, gel injections, conservative treatment.  Discussed with him that if this fails then see Ortho, gave him contact information for Ortho and told to follow-up with his PCP.  Gave him prescription for Tylenol and Voltaren gel.    Medical Decision Making      Disposition and Plan     Clinical Impression:  1. Osteoarthritis of left knee, unspecified osteoarthritis type         Disposition:  Discharge  2025  3:31 pm    Follow-up:  Chandu Lobato MD  [address] [place] [address]  [id number]  [phone]    Follow up in 2 day(s)      Chandu Lobato MD  [facility] [place] [address] [id number] [phone]                Medications Prescribed:  Discharge Medication List as of 5/26/2025  3:32 PM        START taking these medications    Details   acetaminophen 500 MG Oral Tab Take 2 tablets (1,000 mg total) by mouth every 6 (six) hours as needed for Pain., Print, Disp-20 tablet, R-0      diclofenac (VOLTAREN ARTHRITIS PAIN) 1 % External Gel Apply 2 g topically 4 (four) times daily., Print, Disp-2 g, R-0                   Supplementary Documentation:

## 2025-05-26 NOTE — ED QUICK NOTES
Pt states he went on a walk 3 days ago, and yesterday his knee started to hurt him. Denies pain when sitting. Pain with walking and weight bearing.

## (undated) DEVICE — 3M™ RED DOT™ MONITORING ELECTRODE WITH FOAM TAPE AND STICKY GEL, 50/BAG, 20/CASE, 72/PLT 2570: Brand: RED DOT™

## (undated) DEVICE — STERIS KITS

## (undated) DEVICE — DURACLIP 16MM 235CM

## (undated) DEVICE — V2 SPECIMEN COLLECTION MANIFOLD KIT: Brand: NEPTUNE

## (undated) DEVICE — BITEBLOCK ENDOSCP 60FR MAXI STRP

## (undated) DEVICE — Device: Brand: DEFENDO AIR/WATER/SUCTION AND BIOPSY VALVE

## (undated) DEVICE — FILTERLINE NASAL ADULT O2/CO2

## (undated) DEVICE — ENDOSCOPY PACK UPPER: Brand: MEDLINE INDUSTRIES, INC.

## (undated) DEVICE — FORCEP BIOPSY RJ4 LG CAP W/ND

## (undated) DEVICE — CLIP LGT 11MM OPEN 2.8MM 235CM

## (undated) DEVICE — 1200CC GUARDIAN II: Brand: GUARDIAN

## (undated) DEVICE — KIT VLV 5 PC AIR H2O SUCT BX ENDOGATOR CONN

## (undated) DEVICE — NEEDLE CONTRAST INTERJECT 25G

## (undated) DEVICE — 10FT COMBINED O2 DELIVERY/CO2 MONITORING. FILTER WITH MICROSTREAM TYPE LUER: Brand: DUAL ADULT NASAL CANNULA

## (undated) NOTE — LETTER
[address]  [address]  Authorization for Surgical Operation and Procedure     Date:___________                                                                                                         Time:__________  I hereby authorize Surgeon(s):  Lorenzo Aj MD, my physician and his/her assistants (if applicable), which may include medical students, residents, and/or fellows, to perform the following surgical operation/ procedure and administer such anesthesia as may be determined necessary by my physician:  Operation/Procedure name (s) Procedure(s):  PUSH ENTEROSCOPY POSS. EGD on Luc Slade   2.   I recognize that during the surgical operation/procedure, unforeseen conditions may necessitate additional or different procedures than those listed above.  I, therefore, further authorize and request that the above-named surgeon, assistants, or designees perform such procedures as are, in their judgment, necessary and desirable.    3.   My surgeon/physician has discussed prior to my surgery the potential benefits, risks and side effects of this procedure; the likelihood of achieving goals; and potential problems that might occur during recuperation.  They also discussed reasonable alternatives to the procedure, including risks, benefits, and side effects related to the alternatives and risks related to not receiving this procedure.  I have had all my questions answered and I acknowledge that no guarantee has been made as to the result that may be obtained.    4.   Should the need arise during my operation/procedure, which includes change of level of care prior to discharge, I also consent to the administration of blood and/or blood products.  Further, I understand that despite careful testing and screening of blood or blood products by collecting agencies, I may still be subject to ill effects as a result of receiving a blood transfusion and/or blood products.  The  following are some, but not all, of the potential risks that can occur: fever and allergic reactions, hemolytic reactions, transmission of diseases such as Hepatitis, AIDS and Cytomegalovirus (CMV) and fluid overload.  In the event that I wish to have an autologous transfusion of my own blood, or a directed donor transfusion, I will discuss this with my physician.  Check only if Refusing Blood or Blood Products  I understand refusal of blood or blood products as deemed necessary by my physician may have serious consequences to my condition to include possible death. I hereby assume responsibility for my refusal and release the hospital, its personnel, and my physicians from any responsibility for the consequences of my refusal.          o  Refuse      5.   I authorize the use of any specimen, organs, tissues, body parts or foreign objects that may be removed from my body during the operation/procedure for diagnosis, research or teaching purposes and their subsequent disposal by hospital authorities.  I also authorize the release of specimen test results and/or written reports to my treating physician on the hospital medical staff or other referring or consulting physicians involved in my care, at the discretion of the Pathologist or my treating physician.    6.   I consent to the photographing or videotaping of the operations or procedures to be performed, including appropriate portions of my body for medical, scientific, or educational purposes, provided my identity is not revealed by the pictures or by descriptive texts accompanying them.  If the procedure has been photographed/videotaped, the surgeon will obtain the original picture, image, videotape or CD.  The hospital will not be responsible for storage, release or maintenance of the picture, image, tape or CD.    7.   I consent to the presence of a  or observers in the operating room as deemed necessary by my physician or their designees.     8.   I recognize that in the event my procedure results in extended X-Ray/fluoroscopy time, I may develop a skin reaction.    9. If I have a Do Not Attempt Resuscitation (DNAR) order in place, that status will be suspended while in the operating room, procedural suite, and during the recovery period unless otherwise explicitly stated by me (or a person authorized to consent on my behalf). The surgeon or my attending physician will determine when the applicable recovery period ends for purposes of reinstating the DNAR order.  10. Patients having a sterilization procedure: I understand that if the procedure is successful the results will be permanent and it will therefore be impossible for me to inseminate, conceive, or bear children.  I also understand that the procedure is intended to result in sterility, although the result has not been guaranteed.   11. I acknowledge that my physician has explained sedation/analgesia administration to me including the risk and benefits I consent to the administration of sedation/analgesia as may be necessary or desirable in the judgment of my physician.    I CERTIFY THAT I HAVE READ AND FULLY UNDERSTAND THE ABOVE CONSENT TO OPERATION and/or OTHER PROCEDURE.    _________________________________________  __________________________________  Signature of Patient     Signature of Responsible Person         ___________________________________         Printed Name of Responsible Person           _________________________________                 Relationship to Patient  _________________________________________  ______________________________  Signature of Witness          Date  Time      Patient Name: Luc Slade     : 10/4/1932                 Printed: 2025     Medical Record #: [id number]                     Page 1 [address]  [address]    Consent for Anesthesia    I, Luc Slade agree  to be cared for by an anesthesiologist, who is specially trained to monitor me and give me medicine to put me to sleep or keep me comfortable during my procedure    I understand that my anesthesiologist is not an employee or agent of [facility] or [employer]. He or she works for [employer].    As the patient asking for anesthesia services, I agree to:  Allow the anesthesiologist (anesthesia doctor) to give me medicine and do additional procedures as necessary. Some examples are: Starting or using an “IV” to give me medicine, fluids or blood during my procedure, and having a breathing tube placed to help me breathe when I’m asleep (intubation). In the event that my heart stops working properly, I understand that my anesthesiologist will make every effort to sustain my life, unless otherwise directed by [facility] Do Not Resuscitate documents.  Tell my anesthesia doctor before my procedure:  If I am pregnant.  The last time that I ate or drank.  All of the medicines I take (including prescriptions, herbal supplements, and pills I can buy without a prescription (including street drugs/illegal medications). Failure to inform my anesthesiologist about these medicines may increase my risk of anesthetic complications.  If I am allergic to anything or have had a reaction to anesthesia before.  I understand how the anesthesia medicine will help me (benefits).  I understand that with any type of anesthesia medicine there are risks:  The most common risks are: nausea, vomiting, sore throat, muscle soreness, damage to my eyes, mouth, or teeth (from breathing tube placement).  Rare risks include: remembering what happened during my procedure, allergic reactions to medications, injury to my airway, heart, lungs, vision, nerves, or muscles and in extremely rare instances death.  My doctor has explained to me other choices available to me for my care (alternatives).  Pregnant Patients  (“epidural”):  I understand that the risks of having an epidural (medicine given into my back to help control pain during labor), include itching, low blood pressure, difficulty urinating, headache or slowing of the baby’s heart. Very rare risks include infection, bleeding, seizure, irregular heart rhythms and nerve injury.  Regional Anesthesia (“spinal”, “epidural”, & “nerve blocks”):  I understand that rare but potential complications include headache, bleeding, infection, seizure, irregular heart rhythms, and nerve injury.    I can change my mind about having anesthesia services at any time before I get the medicine.    _____________________________________________________________________________  Patient (or Representative) Signature/Relationship to Patient  Date   Time    _____________________________________________________________________________   Name (if used)    Language/Organization   Time    _____________________________________________________________________________  Anesthesiologist Signature     Date   Time  I have discussed the procedure and information above with the patient (or patient’s representative) and answered their questions. The patient or their representative has agreed to have anesthesia services.    _____________________________________________________________________________  Witness        Date   Time  I have verified that the signature is that of the patient or patient’s representative, and that it was signed before the procedure  Patient Name: Luc Slade     : 10/4/1932                 Printed: 2025     Medical Record #: [id number]                     Page 2 of 2

## (undated) NOTE — LETTER
Date & Time: 9/9/2024, 6:39 PM  Patient: Luc Slade        This certifies that I, Luc Slade, a patient at an [facility], am leaving the facility voluntarily and against the advice of my physician.    I acknowledge that I have been:    1. informed that my physician believes that I need to receive care here;  2. informed that if I leave, I could become sicker or even die; and  3. provided discharge instructions consistent with my current diagnosis.    I hereby release my physician, the facility, and its employees from all responsibility for any ill effects which may result from this action.        __________________________________  Patient or authorized caregiver signature    __________________________________  RN signature    If no patient or patient representative signature was obtained, sign below to acknowledge that the form was reviewed with the patient and that the patient refused to sign.    __________________________________  RN signature

## (undated) NOTE — LETTER
Lucie Bishop 89  [address] [facility] [address], [address] [facility]  Authorization for Surgical Operation and Procedure     Date:___________                                                                                                         Time:__________ reactions, hemolytic reactions, transmission of diseases such as Hepatitis, AIDS and Cytomegalovirus (CMV) and fluid overload. In the event that I wish to have an autologous transfusion of my own blood, or a directed donor transfusion.   I will discuss thi ends for purposes of reinstating the DNAR order.   10. Patients having a sterilization procedure: I understand that if the procedure is successful the results will be permanent and it will therefore be impossible for me to inseminate, conceive, or bear chil procedures as necessary. Some examples are: Starting or using an “IV” to give me medicine, fluids or blood during my procedure, and having a breathing tube placed to help me breathe when I’m asleep (intubation).  In the event that my heart stops working pro include headache, bleeding, infection, seizure, irregular heart rhythms, and nerve injury.     I can change my mind about having anesthesia services at any time before I get the medicine.    __________________________________________________________________

## (undated) NOTE — LETTER
[address]  [address]  Authorization for Surgical Operation and Procedure     Date:___________                                                                                                         Time:__________  I hereby authorize Surgeon(s):  Lorenzo Aj MD, my physician and his/her assistants (if applicable), which may include medical students, residents, and/or fellows, to perform the following surgical operation/ procedure and administer such anesthesia as may be determined necessary by my physician:  Operation/Procedure name (s) push enteroscopy with possible biopsy, control of bleeding, polypectomy, dilation on Luc A Berlin   2.   I recognize that during the surgical operation/procedure, unforeseen conditions may necessitate additional or different procedures than those listed above.  I, therefore, further authorize and request that the above-named surgeon, assistants, or designees perform such procedures as are, in their judgment, necessary and desirable.    3.   My surgeon/physician has discussed prior to my surgery the potential benefits, risks and side effects of this procedure; the likelihood of achieving goals; and potential problems that might occur during recuperation.  They also discussed reasonable alternatives to the procedure, including risks, benefits, and side effects related to the alternatives and risks related to not receiving this procedure.  I have had all my questions answered and I acknowledge that no guarantee has been made as to the result that may be obtained.    4.   Should the need arise during my operation/procedure, which includes change of level of care prior to discharge, I also consent to the administration of blood and/or blood products.  Further, I understand that despite careful testing and screening of blood or blood products by collecting agencies, I may still be subject to ill effects as a result of receiving a blood  transfusion and/or blood products.  The following are some, but not all, of the potential risks that can occur: fever and allergic reactions, hemolytic reactions, transmission of diseases such as Hepatitis, AIDS and Cytomegalovirus (CMV) and fluid overload.  In the event that I wish to have an autologous transfusion of my own blood, or a directed donor transfusion, I will discuss this with my physician.  Check only if Refusing Blood or Blood Products  I understand refusal of blood or blood products as deemed necessary by my physician may have serious consequences to my condition to include possible death. I hereby assume responsibility for my refusal and release the hospital, its personnel, and my physicians from any responsibility for the consequences of my refusal.          o  Refuse      5.   I authorize the use of any specimen, organs, tissues, body parts or foreign objects that may be removed from my body during the operation/procedure for diagnosis, research or teaching purposes and their subsequent disposal by hospital authorities.  I also authorize the release of specimen test results and/or written reports to my treating physician on the hospital medical staff or other referring or consulting physicians involved in my care, at the discretion of the Pathologist or my treating physician.    6.   I consent to the photographing or videotaping of the operations or procedures to be performed, including appropriate portions of my body for medical, scientific, or educational purposes, provided my identity is not revealed by the pictures or by descriptive texts accompanying them.  If the procedure has been photographed/videotaped, the surgeon will obtain the original picture, image, videotape or CD.  The hospital will not be responsible for storage, release or maintenance of the picture, image, tape or CD.    7.   I consent to the presence of a  or observers in the operating room as deemed  necessary by my physician or their designees.    8.   I recognize that in the event my procedure results in extended X-Ray/fluoroscopy time, I may develop a skin reaction.    9. If I have a Do Not Attempt Resuscitation (DNAR) order in place, that status will be suspended while in the operating room, procedural suite, and during the recovery period unless otherwise explicitly stated by me (or a person authorized to consent on my behalf). The surgeon or my attending physician will determine when the applicable recovery period ends for purposes of reinstating the DNAR order.  10. Patients having a sterilization procedure: I understand that if the procedure is successful the results will be permanent and it will therefore be impossible for me to inseminate, conceive, or bear children.  I also understand that the procedure is intended to result in sterility, although the result has not been guaranteed.   11. I acknowledge that my physician has explained sedation/analgesia administration to me including the risk and benefits I consent to the administration of sedation/analgesia as may be necessary or desirable in the judgment of my physician.    I CERTIFY THAT I HAVE READ AND FULLY UNDERSTAND THE ABOVE CONSENT TO OPERATION and/or OTHER PROCEDURE.    _________________________________________  __________________________________  Signature of Patient     Signature of Responsible Person         ___________________________________         Printed Name of Responsible Person           _________________________________                 Relationship to Patient  _________________________________________  ______________________________  Signature of Witness          Date  Time      Patient Name: Luc Slade     : 10/4/1932                 Printed: 2025     Medical Record #: [id number]                     Page 1 of 2                                    [address]   [address]    Consent for Anesthesia    ILuc agree to be cared for by an anesthesiologist, who is specially trained to monitor me and give me medicine to put me to sleep or keep me comfortable during my procedure    I understand that my anesthesiologist is not an employee or agent of [facility] or [employer]. He or she works for [employer].    As the patient asking for anesthesia services, I agree to:  Allow the anesthesiologist (anesthesia doctor) to give me medicine and do additional procedures as necessary. Some examples are: Starting or using an “IV” to give me medicine, fluids or blood during my procedure, and having a breathing tube placed to help me breathe when I’m asleep (intubation). In the event that my heart stops working properly, I understand that my anesthesiologist will make every effort to sustain my life, unless otherwise directed by [facility] Do Not Resuscitate documents.  Tell my anesthesia doctor before my procedure:  If I am pregnant.  The last time that I ate or drank.  All of the medicines I take (including prescriptions, herbal supplements, and pills I can buy without a prescription (including street drugs/illegal medications). Failure to inform my anesthesiologist about these medicines may increase my risk of anesthetic complications.  If I am allergic to anything or have had a reaction to anesthesia before.  I understand how the anesthesia medicine will help me (benefits).  I understand that with any type of anesthesia medicine there are risks:  The most common risks are: nausea, vomiting, sore throat, muscle soreness, damage to my eyes, mouth, or teeth (from breathing tube placement).  Rare risks include: remembering what happened during my procedure, allergic reactions to medications, injury to my airway, heart, lungs, vision, nerves, or muscles and in extremely rare instances death.  My doctor has explained to me other choices available  to me for my care (alternatives).  Pregnant Patients (“epidural”):  I understand that the risks of having an epidural (medicine given into my back to help control pain during labor), include itching, low blood pressure, difficulty urinating, headache or slowing of the baby’s heart. Very rare risks include infection, bleeding, seizure, irregular heart rhythms and nerve injury.  Regional Anesthesia (“spinal”, “epidural”, & “nerve blocks”):  I understand that rare but potential complications include headache, bleeding, infection, seizure, irregular heart rhythms, and nerve injury.    I can change my mind about having anesthesia services at any time before I get the medicine.    _____________________________________________________________________________  Patient (or Representative) Signature/Relationship to Patient  Date   Time    _____________________________________________________________________________   Name (if used)    Language/Organization   Time    _____________________________________________________________________________  Anesthesiologist Signature     Date   Time  I have discussed the procedure and information above with the patient (or patient’s representative) and answered their questions. The patient or their representative has agreed to have anesthesia services.    _____________________________________________________________________________  Witness        Date   Time  I have verified that the signature is that of the patient or patient’s representative, and that it was signed before the procedure  Patient Name: Luc Slade     : 10/4/1932                 Printed: 2025     Medical Record #: [id number]                     Page 2 of 2

## (undated) NOTE — LETTER
Date & Time: 8/12/2021, 1:58 PM  Patient: Marquita Castellanos  Encounter Provider(s):    Sabino Seaman MD       To Whom It May Concern:    Thai Jackson was seen and treated in our department on 8/12/2021. He was here with his Son.      If you have any questions